# Patient Record
Sex: FEMALE | Race: WHITE | NOT HISPANIC OR LATINO | Employment: FULL TIME | URBAN - NONMETROPOLITAN AREA
[De-identification: names, ages, dates, MRNs, and addresses within clinical notes are randomized per-mention and may not be internally consistent; named-entity substitution may affect disease eponyms.]

---

## 2017-07-14 ENCOUNTER — OFFICE VISIT - GICH (OUTPATIENT)
Dept: FAMILY MEDICINE | Facility: OTHER | Age: 22
End: 2017-07-14

## 2017-07-14 ENCOUNTER — HISTORY (OUTPATIENT)
Dept: FAMILY MEDICINE | Facility: OTHER | Age: 22
End: 2017-07-14

## 2017-07-14 DIAGNOSIS — N91.2 AMENORRHEA: ICD-10-CM

## 2017-07-14 DIAGNOSIS — Z33.1 PREGNANT STATE, INCIDENTAL: ICD-10-CM

## 2017-07-14 LAB — HCG UR QL: POSITIVE

## 2017-11-25 ENCOUNTER — HISTORY (OUTPATIENT)
Dept: EMERGENCY MEDICINE | Facility: OTHER | Age: 22
End: 2017-11-25

## 2017-12-16 ENCOUNTER — COMMUNICATION - GICH (OUTPATIENT)
Dept: INTERNAL MEDICINE | Facility: OTHER | Age: 22
End: 2017-12-16

## 2017-12-16 DIAGNOSIS — M79.7 FIBROMYALGIA: ICD-10-CM

## 2017-12-16 DIAGNOSIS — M79.10 MYALGIA: ICD-10-CM

## 2017-12-18 ENCOUNTER — COMMUNICATION - GICH (OUTPATIENT)
Dept: INTERNAL MEDICINE | Facility: OTHER | Age: 22
End: 2017-12-18

## 2017-12-18 DIAGNOSIS — M79.10 MYALGIA: ICD-10-CM

## 2017-12-18 DIAGNOSIS — M79.7 FIBROMYALGIA: ICD-10-CM

## 2017-12-27 NOTE — PROGRESS NOTES
Patient Information     Patient Name MRN Jana Pittman [id number] Female 1995      Progress Notes by Kalie Banks NP at 2017 12:30 PM     Author:  Kalie Banks NP Service:  (none) Author Type:  PHYS- Nurse Practitioner     Filed:  2017  5:19 PM Encounter Date:  2017 Status:  Signed     :  Kalie Banks NP (PHYS- Nurse Practitioner)            HPI:    Jana Beyer is a 22 y.o. female who presents to clinic today for confirmation of pregnancy, had positive home pregnancy test. Also reports some nausea, no real vomiting over the last month. Patient was off birth control for 1 month in march and then went back to nuva ring. Patient has stopped nuva ring 3 weeks ago. Patient would like pregnancy verification. Is unsure if pregnancy started in march or with use of nuva ring recently.  Denies feeling any fetal movement yet but feels she has somewhat of a protruding lower abdomen and thinks possibly pregnancy started in March. Reports is a non drinker, smokes less than 1 ppd. Knows she needs to quit. Is eating and taking fluids well. Feels well, wants confirmation of pregnancy.       Past Medical History:     Diagnosis  Date     , ELECTIVE 2011     Alcohol intoxication (HC)  2009    Hospitalized w/acute alcohol intoxication      Alcohol intoxication (HC) 2016     Ectopic pregnancy, tubal 2012     I U D REMOVAL/CHECK 11/15/2011     Intentional overdose of drug in tablet form (HC) 2016     Polyhydramnios 2013     RECTAL BLEEDING 2011     Ruptured ectopic pregnancy 2012     SUBSTANCE ABUSE 2011    THC noted on tox screen 2016 and 2016      TOBACCO ABUSE 3/19/2011     Past Surgical History:      Procedure  Laterality Date     SALPINGO-OOPHORECTOMY  12    Partial Left ,ectopic. Laprascopic salpingectomy       Social History        Substance Use Topics          Smoking status:   Current Every Day  Smoker      Packs/day:  0.75      Years:  4.00      Types:  Cigarettes      Smokeless tobacco:   Never Used       Comment: patient smokes       Alcohol use   0.0 oz/week     0 Standard drinks or equivalent per week        Comment: rarely       Current Outpatient Prescriptions       Medication  Sig Dispense Refill     acetaminophen (TYLENOL) 325 mg tablet Take 650-975 mg by mouth every 4 hours if needed (Pain or Headache). Max acetaminophen dose: 4000mg in 24 hrs.       mupirocin 2% topical (BACTROBAN OINTMENT) ointment APPLY TOPICALLY BID  0     No current facility-administered medications for this visit.      Medications have been reviewed by me and are current to the best of my knowledge and ability.    No Known Allergies    ROS:  Refer to HPI, otherwise negative.    EXAM:  General appearance: well appearing young lady, in no acute distress  Head: normocephalic, atraumatic  Eyes: conjunctivae normal  Orophayrnx: moist mucous membranes,  Neck: supple without adenopathy  Respiratory: clear to auscultation bilaterally  Cardiac: RRR with no murmurs  Abdomen: soft, nontender,   : Suprapubic area with firm non tender protuberance.   Dermatological: no rashes or lesions  Psychological: normal affect, alert and pleasant  Results for orders placed or performed in visit on 17      Urine pregnancy test (HCG), qualitative      Result  Value Ref Range    PREGNANCY,URINE           Positive (Positive) Negative         ASSESSMENT/PLAN:    ICD-10-CM    1. Amenorrhea N91.2 Urine pregnancy test (HCG), qualitative      Urine pregnancy test (HCG), qualitative   2. Pregnancy, unspecified gestational age Z33.1 Prenatal Multivit-Ca-Min-Fe-FA (PRENATAL VITAMIN) tab tablet       Plan: Positive for pregnancy Para 1,  2 In further discussion pt states wants clarification of EDC.   Discussed quantitative serum HCG, prefers to have this done upon return for establishing prenatal care.   Discussed need for positive proactive  prenatal care starting within a week and need to establish w provider and define EDC.  Pt agrees to follow up in the next week.   Reviewed self care measures, no drinking, no smoking or other drug including non prescribed mediations.   Positive nutrition needs. Rx for prenatal vitamins provided.   See patient instructions. Pt in agreements with plan.  Discussed expected course, Signs and symptoms to return to PCP.   No further questions.       Patient Instructions   Your pregnancy test was positive, so you need to set up an appointment with your primary care provider in the next week.     No drinking of any alcohol, no smoking or taking of any other drugs.     I did order your prenatal vitamins which you should start taking today.            Index Tajik Related topics   Prenatal Care   ________________________________________________________________________  KEY POINTS    Prenatal care is the care you receive while you are pregnant. Prenatal care improves your chances for a healthy pregnancy and healthy baby.    At each visit, your healthcare provider will check to make sure that you and the baby are healthy. Besides having a checkup, you may have blood tests, ultrasound scans, or other tests.    Keep all appointments with your healthcare provider. Use these visits to discuss your pregnancy concerns or problems.  ________________________________________________________________________  What is prenatal care?  Prenatal care is the care you receive while you are pregnant. It includes care given by your healthcare provider, support from your family, and giving yourself the care you need. Prenatal care improves your chances for a healthy pregnancy and healthy baby.  When should I see my healthcare provider?  If you are not yet pregnant but plan to get pregnant in the next few months, see your healthcare provider. Your provider may do some tests and talk about things you can do to have a healthy pregnancy and healthy  baby.  You should schedule your first prenatal visit with your healthcare provider as soon as you think you are pregnant. Depending on your health and medical history, your provider will probably schedule visits at least once a month for the first 6 months. During the 7th and 8th months you will see your provider every 2 weeks. During the last month you will probably see your provider once a week until you deliver your baby. If your pregnancy is high risk, your provider will probably want to see you more often or may refer you to a specialist. Pregnancy is considered high risk if you are over the age of 35, or have diabetes or other health problems.  What will happen at the first prenatal visit?  At your first visit, your healthcare provider will ask about your personal medical history. He or she will also ask about the baby s father and your family health history. This information can help give your provider an idea of any problems you may have during your pregnancy. You will have a physical exam, including checks of your height, weight, and blood pressure, and a pelvic exam. You will have a Pap test, urine tests, blood tests, and cultures of the cervix and vagina to check for infection. Your provider will make sure that your shots are up to date.  Your healthcare provider will calculate your due date and the age of your baby. If your periods were regular before you got pregnant, and you are sure of the day when your last period started, your due date will be estimated to be 40 weeks from that day.  Your healthcare provider will talk to you about how to stay healthy during your pregnancy.  What will happen at other prenatal visits?  At each visit your healthcare provider will check to make sure that you and the baby are healthy. Regular visits can help you and your provider prevent possible problems. They can also help your provider find and treat any problems early. In addition to meeting your medical needs, your  provider will advise you about caring for yourself. You will talk about how to have a healthy diet and get plenty of exercise and rest. Your provider can also help you deal with the emotional changes that can happen during pregnancy.  Your healthcare provider will discuss how you are feeling, ask if you have any problems, and answer your questions.  During each prenatal visit your healthcare provider will:    Weigh you    Take your blood pressure    Check your urine for sugar, protein, or bacteria    Check your face, hands, ankles, and feet for swelling    Listen to the baby's heartbeat, starting at about 12 weeks    Measure the size of your uterus to check the baby s growth  At different times during the pregnancy, other exams and tests may be done. Some are routine and others are done only when a problem is suspected or you have a risk factor for a problem. Examples of other tests you might have are:    Tests to check for genetic problems and some birth defects    Ultrasound scans to check the baby's growth, development, and health and to look at your uterus, the bag of fluid that surrounds the baby (amniotic sac), and the placenta (the tissue that carries oxygen and food from your blood to the baby s blood)    Blood tests to check for diabetes    Electronic monitoring to check the health of the baby  You may also be given shots to protect you against some common infections.  How can I take care of myself during my pregnancy?  Here are some things you can do to take good care of yourself during your pregnancy and prepare for the birth of your child:    Keep all appointments with your healthcare provider. Use these visits to discuss your pregnancy concerns or problems. Write down questions before each visit so that you won t forget about things you want to talk about.    Eat healthy meals that include whole grains, fresh fruits and vegetables, and calcium-rich foods, such as milk, cheese, and yogurt. Choose foods  low in saturated fat. Ask your provider if there are foods you should not eat or if you should limit how much you eat.    Drink plenty of water each day.    Take vitamins, other supplements, and medicines as recommended by your healthcare provider. Talk to your provider before you take any medicine, including nonprescription and herbal medicines. Some medicines are not safe during pregnancy.    Unless your healthcare provider tells you not to, try to be physically active for at least 30 minutes a day, most days of the week. You might find it easier to exercise 10 minutes at a time, 3 times a day. You may want to take a prenatal exercise class.    Do not smoke, drink alcohol, or take illegal drugs.    Avoid hot tubs or saunas.    If you have cats in your home, do not empty the cat litter while you are pregnant. It may contain a parasite that causes an infection called toxoplasmosis, which can cause birth defects. Also, use gloves when you work in garden areas used by cats.    Stay away from toxic chemicals like insecticides, solvents (such as some  or paint thinners), lead, and mercury. Check labels on household products. Most dangerous products have pregnancy warnings on their labels. Ask your healthcare provider about products if you are unsure.    Relax by taking breaks from work or chores.    Help reduce stress by sharing your feelings with others.    Report any violence or other types of abuse in your home.    Learn more about pregnancy, labor, and delivery. Read books, watch videos, go to a childbirth class, and talk with experienced moms.    Plan for the lifestyle changes a new baby will bring. Prepare for possible changes in your budget, work situation, daily schedule, and relationships with family and friends.    Talk to your healthcare provider about the pros and cons of breast-feeding.  Before and during your pregnancy, try to do everything you can to keep yourself and your baby healthy during your  pregnancy.  Developed by [employer].  Adult Advisor 2016.3 published by [employer].  Last modified: 2016-02-17  Last reviewed: 2016-02-10  This content is reviewed periodically and is subject to change as new health information becomes available. The information is intended to inform and educate and is not a replacement for medical evaluation, advice, diagnosis or treatment by a healthcare professional.  References   Adult Advisor 2016.3 Index    Copyright   2016 [employer], a division of [employer] All rights reserved.

## 2017-12-29 NOTE — PATIENT INSTRUCTIONS
Patient Information     Patient Name MRN Jana Pittman [id number] Female 1995      Patient Instructions by Kalie Banks NP at 2017  1:30 PM     Author:  Kalie Banks NP  Service:  (none) Author Type:  PHYS- Nurse Practitioner     Filed:  2017  1:30 PM  Encounter Date:  2017 Status:  Addendum     :  Kalie Banks NP (PHYS- Nurse Practitioner)        Related Notes: Original Note by Kalie Banks NP (PHYS- Nurse Practitioner) filed at 2017  1:30 PM            Your pregnancy test was positive, so you need to set up an appointment with your primary care provider in the next week.     No drinking of any alcohol, no smoking or taking of any other drugs.     I did order your prenatal vitamins which you should start taking today.            Index Danish Related topics   Prenatal Care   ________________________________________________________________________  KEY POINTS    Prenatal care is the care you receive while you are pregnant. Prenatal care improves your chances for a healthy pregnancy and healthy baby.    At each visit, your healthcare provider will check to make sure that you and the baby are healthy. Besides having a checkup, you may have blood tests, ultrasound scans, or other tests.    Keep all appointments with your healthcare provider. Use these visits to discuss your pregnancy concerns or problems.  ________________________________________________________________________  What is prenatal care?  Prenatal care is the care you receive while you are pregnant. It includes care given by your healthcare provider, support from your family, and giving yourself the care you need. Prenatal care improves your chances for a healthy pregnancy and healthy baby.  When should I see my healthcare provider?  If you are not yet pregnant but plan to get pregnant in the next few months, see your healthcare provider. Your provider may do some tests and talk about  things you can do to have a healthy pregnancy and healthy baby.  You should schedule your first prenatal visit with your healthcare provider as soon as you think you are pregnant. Depending on your health and medical history, your provider will probably schedule visits at least once a month for the first 6 months. During the 7th and 8th months you will see your provider every 2 weeks. During the last month you will probably see your provider once a week until you deliver your baby. If your pregnancy is high risk, your provider will probably want to see you more often or may refer you to a specialist. Pregnancy is considered high risk if you are over the age of 35, or have diabetes or other health problems.  What will happen at the first prenatal visit?  At your first visit, your healthcare provider will ask about your personal medical history. He or she will also ask about the baby s father and your family health history. This information can help give your provider an idea of any problems you may have during your pregnancy. You will have a physical exam, including checks of your height, weight, and blood pressure, and a pelvic exam. You will have a Pap test, urine tests, blood tests, and cultures of the cervix and vagina to check for infection. Your provider will make sure that your shots are up to date.  Your healthcare provider will calculate your due date and the age of your baby. If your periods were regular before you got pregnant, and you are sure of the day when your last period started, your due date will be estimated to be 40 weeks from that day.  Your healthcare provider will talk to you about how to stay healthy during your pregnancy.  What will happen at other prenatal visits?  At each visit your healthcare provider will check to make sure that you and the baby are healthy. Regular visits can help you and your provider prevent possible problems. They can also help your provider find and treat any  problems early. In addition to meeting your medical needs, your provider will advise you about caring for yourself. You will talk about how to have a healthy diet and get plenty of exercise and rest. Your provider can also help you deal with the emotional changes that can happen during pregnancy.  Your healthcare provider will discuss how you are feeling, ask if you have any problems, and answer your questions.  During each prenatal visit your healthcare provider will:    Weigh you    Take your blood pressure    Check your urine for sugar, protein, or bacteria    Check your face, hands, ankles, and feet for swelling    Listen to the baby's heartbeat, starting at about 12 weeks    Measure the size of your uterus to check the baby s growth  At different times during the pregnancy, other exams and tests may be done. Some are routine and others are done only when a problem is suspected or you have a risk factor for a problem. Examples of other tests you might have are:    Tests to check for genetic problems and some birth defects    Ultrasound scans to check the baby's growth, development, and health and to look at your uterus, the bag of fluid that surrounds the baby (amniotic sac), and the placenta (the tissue that carries oxygen and food from your blood to the baby s blood)    Blood tests to check for diabetes    Electronic monitoring to check the health of the baby  You may also be given shots to protect you against some common infections.  How can I take care of myself during my pregnancy?  Here are some things you can do to take good care of yourself during your pregnancy and prepare for the birth of your child:    Keep all appointments with your healthcare provider. Use these visits to discuss your pregnancy concerns or problems. Write down questions before each visit so that you won t forget about things you want to talk about.    Eat healthy meals that include whole grains, fresh fruits and vegetables, and  calcium-rich foods, such as milk, cheese, and yogurt. Choose foods low in saturated fat. Ask your provider if there are foods you should not eat or if you should limit how much you eat.    Drink plenty of water each day.    Take vitamins, other supplements, and medicines as recommended by your healthcare provider. Talk to your provider before you take any medicine, including nonprescription and herbal medicines. Some medicines are not safe during pregnancy.    Unless your healthcare provider tells you not to, try to be physically active for at least 30 minutes a day, most days of the week. You might find it easier to exercise 10 minutes at a time, 3 times a day. You may want to take a prenatal exercise class.    Do not smoke, drink alcohol, or take illegal drugs.    Avoid hot tubs or saunas.    If you have cats in your home, do not empty the cat litter while you are pregnant. It may contain a parasite that causes an infection called toxoplasmosis, which can cause birth defects. Also, use gloves when you work in garden areas used by cats.    Stay away from toxic chemicals like insecticides, solvents (such as some  or paint thinners), lead, and mercury. Check labels on household products. Most dangerous products have pregnancy warnings on their labels. Ask your healthcare provider about products if you are unsure.    Relax by taking breaks from work or chores.    Help reduce stress by sharing your feelings with others.    Report any violence or other types of abuse in your home.    Learn more about pregnancy, labor, and delivery. Read books, watch videos, go to a childbirth class, and talk with experienced moms.    Plan for the lifestyle changes a new baby will bring. Prepare for possible changes in your budget, work situation, daily schedule, and relationships with family and friends.    Talk to your healthcare provider about the pros and cons of breast-feeding.  Before and during your pregnancy, try to do  everything you can to keep yourself and your baby healthy during your pregnancy.  Developed by [employer].  Adult Advisor 2016.3 published by [employer].  Last modified: 2016-02-17  Last reviewed: 2016-02-10  This content is reviewed periodically and is subject to change as new health information becomes available. The information is intended to inform and educate and is not a replacement for medical evaluation, advice, diagnosis or treatment by a healthcare professional.  References   Adult Advisor 2016.3 Index    Copyright   2016 [employer], a division of [employer] All rights reserved.

## 2017-12-30 NOTE — NURSING NOTE
Patient Information     Patient Name MRN Jana Pittman [id number] Female 1995      Nursing Note by Arianna Lazar at 2017 12:30 PM     Author:  Arianna Lazar Service:  (none) Author Type:  NURS- Student Practical Nurse     Filed:  2017  1:16 PM Encounter Date:  2017 Status:  Signed     :  Arianna Lazar (NURS- Student Practical Nurse)            Patient presents with positive home pregnancy, nausea. Patient was off birth control for 1 month in march and then went back to nuva ring. Patient has stopped nuva ring 3 weeks ago. Patient would like pregnancy verification.

## 2018-01-09 ENCOUNTER — COMMUNICATION - GICH (OUTPATIENT)
Dept: INTERNAL MEDICINE | Facility: OTHER | Age: 23
End: 2018-01-09

## 2018-01-09 DIAGNOSIS — M79.10 MYALGIA: ICD-10-CM

## 2018-01-09 DIAGNOSIS — M79.7 FIBROMYALGIA: ICD-10-CM

## 2018-01-26 VITALS
TEMPERATURE: 98.7 F | SYSTOLIC BLOOD PRESSURE: 120 MMHG | HEART RATE: 80 BPM | WEIGHT: 127.4 LBS | BODY MASS INDEX: 21.75 KG/M2 | HEIGHT: 64 IN | DIASTOLIC BLOOD PRESSURE: 60 MMHG

## 2018-02-01 ENCOUNTER — DOCUMENTATION ONLY (OUTPATIENT)
Dept: FAMILY MEDICINE | Facility: OTHER | Age: 23
End: 2018-02-01

## 2018-02-01 PROBLEM — M79.7 FIBROMYALGIA: Status: ACTIVE | Noted: 2017-11-25

## 2018-02-01 PROBLEM — M79.18 MYOFASCIAL MUSCLE PAIN: Status: ACTIVE | Noted: 2017-11-25

## 2018-02-01 RX ORDER — ACETAMINOPHEN 325 MG/1
650-975 TABLET ORAL EVERY 4 HOURS PRN
COMMUNITY
End: 2019-01-17

## 2018-02-01 RX ORDER — GABAPENTIN 300 MG/1
300 CAPSULE ORAL 3 TIMES DAILY PRN
COMMUNITY
Start: 2018-01-09 | End: 2018-05-29

## 2018-02-12 NOTE — TELEPHONE ENCOUNTER
Patient Information     Patient Name MRN Jana Pittman [id number] Female 1995      Telephone Encounter by Maura Snow RN at 2017 11:55 AM     Author:  Maura Snow RN Service:  (none) Author Type:  NURS- Registered Nurse     Filed:  2017 12:00 PM Encounter Date:  2017 Status:  Signed     :  Maura Snow RN (NURS- Registered Nurse)            gabapentin (NEURONTIN) 300 mg capsule  TAKE 1 CAPSULE BY MOUTH THREE TIMES DAILY AS NEEDED FOR FIBROMYALGIA PAIN/SLEEP  Disp: 21 capsule Refills: 0    Class: eRx Start: 2017    For: Myofascial muscle pain, Fibromyalgia  Originally ordered: 3 weeks ago by Nakul Haji MD  Last refill:2017  To be filled at: [facility] [address] [place] [address] AT [address] - [phone]: [phone]    Last visit with NAKUL HAJI was on: No past appointments listed with this provider  PCP:  No Pcp  Controlled Substance Agreement:  None noted     Patient has no  PCP noted Gabapentin noted as prescribed at ED visit  On 17 by Dr. Haji.  Will route to Dr. Haji for consideration of refill      Unable to complete prescription refill per RN Medication Refill Policy.................... MAURA SNOW RN ....................  2017   11:58 AM

## 2018-02-12 NOTE — TELEPHONE ENCOUNTER
Patient Information     Patient Name MRN Jana Pittman [id number] Female 1995      Telephone Encounter by Kimo Haji MD at 2017  7:42 PM     Author:  Kimo Haji MD Service:  (none) Author Type:  Physician     Filed:  2017  7:43 PM Encounter Date:  2017 Status:  Signed     :  Kimo Haji MD (Physician)            Noted. rx sent to pharmacy.   Advise to follow-up with me, prior to running out of gabapentin.     Kimo Haji MD

## 2018-02-12 NOTE — TELEPHONE ENCOUNTER
Patient Information     Patient Name MRN Jana Pittman [id number] Female 1995      Telephone Encounter by Katie Levin at 2017  3:47 PM     Author:  Katie Levin Service:  (none) Author Type:  (none)     Filed:  2017  3:54 PM Encounter Date:  2017 Status:  Signed     :  Katie Levin            Talked with Jana and she states that she requested a refill on the gabapentin. After reviewing chart, this was denied by Dr. Haji stating that she needs to get it form her psychiatrist. Patient states that she does not see anyone. Her PCP used to be Dr. Murray. She is wondering if she could get 1 weeks worth until she can get in? She states that her car is broken down right now and she is not able to get a ride into town for an appointment.  She does have someone that could  her Rx for her.  Katie Levin LPN  2017  3:52 PM

## 2018-02-12 NOTE — TELEPHONE ENCOUNTER
Patient Information     Patient Name MRN Jana Pittman [id number] Female 1995      Telephone Encounter by Maura Murry RN at 2017  3:42 PM     Author:  Maura Murry RN Service:  (none) Author Type:  NURS- Registered Nurse     Filed:  2017  3:44 PM Encounter Date:  2017 Status:  Signed     :  Maura Murry RN (NURS- Registered Nurse)            [facility] notified.  MAURA MURRY RN ....................  2017   3:42 PM
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
## 2018-02-12 NOTE — TELEPHONE ENCOUNTER
Patient Information     Patient Name MRN Jana Pittman [id number] Female 1995      Telephone Encounter by Mireille Aranda at 2017  8:55 AM     Author:  Mireille Aranda Service:  (none) Author Type:  (none)     Filed:  2017  8:58 AM Encounter Date:  2017 Status:  Signed     :  Mireille Aranda            Spoke with patient and notified per Kimo Haji MD.Mireille Aranda LPN......................2017 8:58 AM

## 2018-05-29 DIAGNOSIS — M79.7 FIBROMYALGIA: Primary | ICD-10-CM

## 2018-05-29 RX ORDER — GABAPENTIN 300 MG/1
300 CAPSULE ORAL 3 TIMES DAILY PRN
Qty: 15 CAPSULE | Refills: 0 | Status: SHIPPED | OUTPATIENT
Start: 2018-05-29 | End: 2019-01-17

## 2018-05-29 NOTE — TELEPHONE ENCOUNTER
Pt in [facility] for , wants to know if can get temporary supply of gabapentin, has not slept in 5 days.  Please send to [facility] in [facility], ph# [phone]. Please call.

## 2018-05-29 NOTE — TELEPHONE ENCOUNTER
Writer contacted patient. Alerted her that Rx as requested was filled as noted below:    Medication Detail      Disp Refills Start End      gabapentin (NEURONTIN) 300 MG capsule 15 capsule 0 5/29/2018     Sig - Route: Take 1 capsule (300 mg) by mouth 3 times daily as needed - Oral    Class: E-Prescribe    Order: [id number]    E-Prescribing Status: Receipt confirmed by pharmacy (5/29/2018 10:12 AM CDT)    Printout Tracking   External Result Report   Pharmacy   [facility] [address] [place] [address] [facility] AT [address] [place] [address]     Patient happy with plan of care. Will call back with any further questions or concerns.    Stanford Santos RN on 5/29/2018 at 10:18 AM

## 2018-06-01 DIAGNOSIS — M79.7 FIBROMYALGIA: ICD-10-CM

## 2018-06-01 RX ORDER — GABAPENTIN 300 MG/1
300 CAPSULE ORAL 3 TIMES DAILY PRN
Qty: 15 CAPSULE | Refills: 0 | OUTPATIENT
Start: 2018-06-01

## 2018-06-01 NOTE — TELEPHONE ENCOUNTER
At this time I would recommend that she decrease her dose of Neurontin gradually through the weekend and due to recurrent refills of a controlled substance with out a visit I would defer to her primary care provider.

## 2018-06-01 NOTE — TELEPHONE ENCOUNTER
Writer received soft transfer with patient on the line. Per scheduling staff, patient is looking for a refill of her gabapentin.    Writer accepted soft transfer. Patient reports:    She is in [place] at a , received a 5 day supply of gabapentin from Dr. Haji on 18, but has now decided to stay a little longer. Is looking for additional supply of gabapentin if possible.    Writer notes that PCP is out of the office this afternoon, as well as that patient is overdue for follow up with PCP, and has been given several reminders in the past with previous refill requests. See 18 refill encounter, 17 refill encounter, as well as note that Rx was prescribed by Dr. Haji at an ED visit on 17.    Patient is aware of need to follow up with Dr. Haji, was given an additional reminder at this time.    Writer will jaz up and route limited refill request to PCP's teamlet in PCP's absence for her consideration/approval at this time. Patient is aware that Rx may not be filled.    Pharmacy in [place] has been inputted into this encounter.    Unable to complete prescription refill per RN Medication Refill Policy. Stanford Santos 2018 1:50 PM

## 2018-06-04 NOTE — TELEPHONE ENCOUNTER
Writer contacted patient. Was unable to speak to patient at this time, but was able to leave message on patient's voicemail. Message left advising patient of Dr. Worley's response as noted. Advised patient to call back with any additional questions or concerns. Rx request has already been refused to pharmacy. Writer will close this encounter at this time.    Stanford Santos RN on 6/4/2018 at 8:12 AM

## 2018-07-15 ENCOUNTER — OFFICE VISIT (OUTPATIENT)
Dept: FAMILY MEDICINE | Facility: OTHER | Age: 23
End: 2018-07-15
Attending: NURSE PRACTITIONER
Payer: COMMERCIAL

## 2018-07-15 ENCOUNTER — HOSPITAL ENCOUNTER (OUTPATIENT)
Dept: GENERAL RADIOLOGY | Facility: OTHER | Age: 23
Discharge: HOME | End: 2018-07-15
Attending: NURSE PRACTITIONER | Admitting: NURSE PRACTITIONER
Payer: COMMERCIAL

## 2018-07-15 VITALS
DIASTOLIC BLOOD PRESSURE: 68 MMHG | SYSTOLIC BLOOD PRESSURE: 138 MMHG | RESPIRATION RATE: 18 BRPM | BODY MASS INDEX: 21.1 KG/M2 | HEART RATE: 104 BPM | WEIGHT: 122.1 LBS | TEMPERATURE: 100 F

## 2018-07-15 DIAGNOSIS — J20.9 ACUTE BRONCHITIS, UNSPECIFIED ORGANISM: ICD-10-CM

## 2018-07-15 DIAGNOSIS — R05.8 PRODUCTIVE COUGH: Primary | ICD-10-CM

## 2018-07-15 DIAGNOSIS — H65.93 OME (OTITIS MEDIA WITH EFFUSION), BILATERAL: ICD-10-CM

## 2018-07-15 DIAGNOSIS — R05.8 PRODUCTIVE COUGH: ICD-10-CM

## 2018-07-15 DIAGNOSIS — R09.81 NASAL SINUS CONGESTION: ICD-10-CM

## 2018-07-15 DIAGNOSIS — R50.9 FEVER AND CHILLS: ICD-10-CM

## 2018-07-15 DIAGNOSIS — R07.0 THROAT PAIN: ICD-10-CM

## 2018-07-15 PROCEDURE — 25000132 ZZH RX MED GY IP 250 OP 250 PS 637: Performed by: NURSE PRACTITIONER

## 2018-07-15 PROCEDURE — G0463 HOSPITAL OUTPT CLINIC VISIT: HCPCS | Mod: 25

## 2018-07-15 PROCEDURE — G0463 HOSPITAL OUTPT CLINIC VISIT: HCPCS

## 2018-07-15 PROCEDURE — 71046 X-RAY EXAM CHEST 2 VIEWS: CPT

## 2018-07-15 PROCEDURE — 99214 OFFICE O/P EST MOD 30 MIN: CPT | Performed by: NURSE PRACTITIONER

## 2018-07-15 RX ORDER — ACETAMINOPHEN 500 MG
1000 TABLET ORAL ONCE
Status: COMPLETED | OUTPATIENT
Start: 2018-07-15 | End: 2018-07-15

## 2018-07-15 RX ORDER — BENZONATATE 100 MG/1
100 CAPSULE ORAL 3 TIMES DAILY PRN
Qty: 42 CAPSULE | Refills: 0 | Status: SHIPPED | OUTPATIENT
Start: 2018-07-15 | End: 2019-01-17

## 2018-07-15 RX ADMIN — ACETAMINOPHEN 1000 MG: 500 TABLET, FILM COATED ORAL at 17:08

## 2018-07-15 ASSESSMENT — PAIN SCALES - GENERAL: PAINLEVEL: SEVERE PAIN (6)

## 2018-07-15 NOTE — PATIENT INSTRUCTIONS
Augmentin twice daily x 10 days     Tessalon as needed for cough - one or two during if needed and one or two at bedtime as needed    Mucinex as needed    Tylenol or ibuprofen     Push fluids    Follow up if worsening or concerns

## 2018-07-15 NOTE — MR AVS SNAPSHOT
"              After Visit Summary   7/15/2018    Jana Beyer    MRN: [id number]           Patient Information     Date Of Birth          1995        Visit Information        Provider Department      7/15/2018 4:15 PM Lisbet Pollack NP [facility]        Today's Diagnoses     Productive cough    -  1    Fever and chills        Nasal sinus congestion        Ear pain, bilateral        Acute bronchitis, unspecified organism        Throat pain          Care Instructions    Augmentin twice daily x 10 days     Tessalon as needed for cough - one or two during if needed and one or two at bedtime as needed    Mucinex as needed    Tylenol or ibuprofen     Push fluids    Follow up if worsening or concerns              Follow-ups after your visit        Future tests that were ordered for you today     Open Future Orders        Priority Expected Expires Ordered    XR Chest 2 Views Routine 7/15/2018 7/15/2019 7/15/2018            Who to contact     If you have questions or need follow up information about today's clinic visit or your schedule please contact [facility] directly at [phone].  Normal or non-critical lab and imaging results will be communicated to you by [employer], letter or phone within 4 business days after the clinic has received the results. If you do not hear from us within 7 days, please contact the clinic through [employer] or phone. If you have a critical or abnormal lab result, we will notify you by phone as soon as possible.  Submit refill requests through [employer] or call your pharmacy and they will forward the refill request to us. Please allow 3 business days for your refill to be completed.          Additional Information About Your Visit        [employer] Hawaiihar[employer]rox Information     [employer] lets you send messages to your doctor, view your test results, renew your prescriptions, schedule appointments and more. To sign up, go to [email or web address] . Click on \"Log " "in\" on the left side of the screen, which will take you to the Welcome page. Then click on \"Sign up Now\" on the right side of the page.     You will be asked to enter the access code listed below, as well as some personal information. Please follow the directions to create your username and password.     Your access code is: [email or web address]  Expires: 10/13/2018  5:07 PM     Your access code will  in 90 days. If you need help or a new code, please call your [facility] clinic or [phone].        Care EveryWhere ID     This is your Care EveryWhere ID. This could be used by other organizations to access your [facility] medical records  [id number]        Your Vitals Were     Pulse Temperature Respirations Last Period Breastfeeding? BMI (Body Mass Index)    104 100  F (37.8  C) (Tympanic) 18 2018 (Approximate) No 21.1 kg/m2       Blood Pressure from Last 3 Encounters:   07/15/18 138/68   17 120/60   16 118/60    Weight from Last 3 Encounters:   07/15/18 122 lb 1.6 oz (55.4 kg)   17 127 lb 6.4 oz (57.8 kg)   16 124 lb (56.2 kg)                 Today's Medication Changes          These changes are accurate as of 7/15/18  5:07 PM.  If you have any questions, ask your nurse or doctor.               Start taking these medicines.        Dose/Directions    amoxicillin-clavulanate 875-125 MG per tablet   Commonly known as:  AUGMENTIN   Used for:  Acute bronchitis, unspecified organism, Productive cough, Fever and chills, Nasal sinus congestion, Ear pain, bilateral, Throat pain   Started by:  Lisbet Pollack NP        Dose:  1 tablet   Take 1 tablet by mouth 2 times daily   Quantity:  20 tablet   Refills:  0       benzonatate 100 MG capsule   Commonly known as:  TESSALON   Used for:  Productive cough   Started by:  Lisbet Pollack NP        Dose:  100 mg   Take 1 capsule (100 mg) by mouth 3 times daily as needed   Quantity:  42 capsule   Refills:  0            Where to get your medicines    "   These medications were sent to [id number] - [place], [place] - [address] AT [address]  [address], [place] [address]     Phone:  [phone]     amoxicillin-clavulanate 875-125 MG per tablet    benzonatate 100 MG capsule                Primary Care Provider Office Phone # Fax #    Kimo Haji [phone]       [address]  [facility] [address]        Equal Access to Services     [facility] Hospital[facility]ÉCTOR : Hadii aad ku hadasho Soomaali, waaxda luqadaha, qaybta kaalmada adeegyada, waxay meredithin hayvalentinon abelardo betancourt . So [facility] [phone].    ATENCIÓN: Si habla español, tiene a doshi disposición servicios gratuitos de asistencia lingüística. [facility] [phone].    We comply with applicable federal civil rights laws and [place] laws. We do not discriminate on the basis of race, color, national origin, age, disability, sex, sexual orientation, or gender identity.            Thank you!     Thank you for choosing [facility]  for your care. Our goal is always to provide you with excellent care. Hearing back from our patients is one way we can continue to improve our services. Please take a few minutes to complete the written survey that you may receive in the mail after your visit with us. Thank you!             Your Updated Medication List - Protect others around you: Learn how to safely use, store and throw away your medicines at www.disposemymeds.org.          This list is accurate as of 7/15/18  5:07 PM.  Always use your most recent med list.                   Brand Name Dispense Instructions for use Diagnosis    acetaminophen 325 MG tablet    TYLENOL     Take 650-975 mg by mouth every 4 hours as needed for pain or headaches . Max acetaminophen dose: 4000mg in 24 hrs.        amoxicillin-clavulanate 875-125 MG per tablet    AUGMENTIN    20 tablet    Take 1 tablet by mouth 2 times daily    Acute bronchitis, unspecified  organism, Productive cough, Fever and chills, Nasal sinus congestion, Ear pain, bilateral, Throat pain       benzonatate 100 MG capsule    TESSALON    42 capsule    Take 1 capsule (100 mg) by mouth 3 times daily as needed    Productive cough       gabapentin 300 MG capsule    NEURONTIN    15 capsule    Take 1 capsule (300 mg) by mouth 3 times daily as needed    Fibromyalgia
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
## 2018-07-15 NOTE — NURSING NOTE
Jana presents to the clinic today for concerns of a cough and bilateral ear pain. Patient also complains that she has been hard of hearing. States that this has been going on since July 4th.        Philip Tracy LPN 07/15/18 4:25 PM

## 2018-07-18 ENCOUNTER — TELEPHONE (OUTPATIENT)
Dept: FAMILY MEDICINE | Facility: OTHER | Age: 23
End: 2018-07-18

## 2018-07-18 DIAGNOSIS — R05.8 PRODUCTIVE COUGH: ICD-10-CM

## 2018-07-18 DIAGNOSIS — R07.0 THROAT PAIN: ICD-10-CM

## 2018-07-18 DIAGNOSIS — R50.9 FEVER AND CHILLS: ICD-10-CM

## 2018-07-18 DIAGNOSIS — J20.9 ACUTE BRONCHITIS, UNSPECIFIED ORGANISM: ICD-10-CM

## 2018-07-18 DIAGNOSIS — R09.81 NASAL SINUS CONGESTION: ICD-10-CM
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
## 2018-07-21 ENCOUNTER — HOSPITAL ENCOUNTER (EMERGENCY)
Facility: OTHER | Age: 23
Discharge: HOME | End: 2018-07-21
Attending: EMERGENCY MEDICINE | Admitting: EMERGENCY MEDICINE
Payer: COMMERCIAL

## 2018-07-21 VITALS
BODY MASS INDEX: 20.91 KG/M2 | OXYGEN SATURATION: 95 % | HEART RATE: 108 BPM | DIASTOLIC BLOOD PRESSURE: 92 MMHG | TEMPERATURE: 99 F | WEIGHT: 121 LBS | RESPIRATION RATE: 14 BRPM | SYSTOLIC BLOOD PRESSURE: 140 MMHG

## 2018-07-21 DIAGNOSIS — F19.10 SUBSTANCE ABUSE (H): ICD-10-CM

## 2018-07-21 LAB
ALBUMIN UR-MCNC: NEGATIVE MG/DL
AMPHETAMINES UR QL SCN: NOT DETECTED
APPEARANCE UR: CLEAR
BARBITURATES UR QL: NOT DETECTED
BENZODIAZ UR QL: NOT DETECTED
BILIRUB UR QL STRIP: NEGATIVE
BUPRENORPHINE UR QL: NOT DETECTED NG/ML
CANNABINOIDS UR QL: ABNORMAL NG/ML
COCAINE UR QL: NOT DETECTED
COLOR UR AUTO: YELLOW
D-METHAMPHET UR QL: NOT DETECTED NG/ML
GLUCOSE UR STRIP-MCNC: NEGATIVE MG/DL
HCG UR QL: NEGATIVE
HGB UR QL STRIP: NEGATIVE
KETONES UR STRIP-MCNC: NEGATIVE MG/DL
LEUKOCYTE ESTERASE UR QL STRIP: NEGATIVE
METHADONE UR QL SCN: NOT DETECTED
NITRATE UR QL: NEGATIVE
OPIATES UR QL SCN: NOT DETECTED
OXYCODONE UR QL: ABNORMAL NG/ML
PCP UR QL SCN: NOT DETECTED
PH UR STRIP: 7 PH (ref 5–9)
PROPOXYPH UR QL: NOT DETECTED NG/ML
SOURCE: NORMAL
SP GR UR STRIP: <1.005 (ref 1–1.03)
TRICYCLICS UR QL SCN: NOT DETECTED NG/ML
UROBILINOGEN UR STRIP-ACNC: 0.2 EU/DL (ref 0.2–1)

## 2018-07-21 PROCEDURE — 81025 URINE PREGNANCY TEST: CPT | Performed by: EMERGENCY MEDICINE

## 2018-07-21 PROCEDURE — 80307 DRUG TEST PRSMV CHEM ANLYZR: CPT | Performed by: EMERGENCY MEDICINE

## 2018-07-21 PROCEDURE — 25000132 ZZH RX MED GY IP 250 OP 250 PS 637: Performed by: EMERGENCY MEDICINE

## 2018-07-21 PROCEDURE — 99283 EMERGENCY DEPT VISIT LOW MDM: CPT | Performed by: EMERGENCY MEDICINE

## 2018-07-21 PROCEDURE — 99283 EMERGENCY DEPT VISIT LOW MDM: CPT | Mod: Z6 | Performed by: EMERGENCY MEDICINE

## 2018-07-21 PROCEDURE — 81003 URINALYSIS AUTO W/O SCOPE: CPT | Performed by: EMERGENCY MEDICINE

## 2018-07-21 RX ORDER — LORAZEPAM 1 MG/1
1 TABLET ORAL ONCE
Status: COMPLETED | OUTPATIENT
Start: 2018-07-21 | End: 2018-07-21

## 2018-07-21 RX ADMIN — LORAZEPAM 1 MG: 1 TABLET ORAL at 17:24

## 2018-07-21 ASSESSMENT — ENCOUNTER SYMPTOMS
CHILLS: 0
VOMITING: 0
NAUSEA: 0
FEVER: 0
SHORTNESS OF BREATH: 0
CHEST TIGHTNESS: 0
ARTHRALGIAS: 0
LIGHT-HEADEDNESS: 0
DYSURIA: 0
AGITATION: 0

## 2018-07-21 NOTE — TELEPHONE ENCOUNTER
[facility] confirm that patient has not picked up antibiotics yet.    No answer at return call back number, voicemail has not been set up.  Unable to leave a message.      Deepali Glasgow LPN 7/21/2018 10:47 AM

## 2018-07-21 NOTE — ED AVS SNAPSHOT
[facility]    [address]    [place] [address]    Phone:  [phone]    Fax:  [phone]                                       Jana Beyer   MRN: [id number]    Department:  [facility]   Date of Visit:  7/21/2018           Patient Information     Date Of Birth          1995        Your diagnoses for this visit were:     Substance abuse        You were seen by Jasper Weber MD.        Discharge Instructions       Follow-up with crisis response team and with the services that you discussed with them. Return if worse.        24 Hour Appointment Hotline     To schedule an appointment at [facility], please call [phone]. If you don't have a family doctor or clinic, we will help you find one. [facility] clinics are conveniently located to serve the needs of you and your family.           Review of your medicines      Our records show that you are taking the medicines listed below. If these are incorrect, please call your family doctor or clinic.        Dose / Directions Last dose taken    acetaminophen 325 MG tablet   Commonly known as:  TYLENOL   Dose:  650-975 mg        Take 650-975 mg by mouth every 4 hours as needed for pain or headaches . Max acetaminophen dose: 4000mg in 24 hrs.   Refills:  0        amoxicillin-clavulanate 875-125 MG per tablet   Commonly known as:  AUGMENTIN   Dose:  1 tablet   Quantity:  14 tablet        Take 1 tablet by mouth 2 times daily for 7 days   Refills:  0        benzonatate 100 MG capsule   Commonly known as:  TESSALON   Dose:  100 mg   Quantity:  42 capsule        Take 1 capsule (100 mg) by mouth 3 times daily as needed   Refills:  0        gabapentin 300 MG capsule   Commonly known as:  NEURONTIN   Dose:  300 mg   Quantity:  15 capsule        Take 1 capsule (300 mg) by mouth 3 times daily as needed   Refills:  0                Procedures and tests performed during your visit     *UA reflex to Microscopic     "Drug of Abuse Screen Urine GH    HCG qualitative urine      Orders Needing Specimen Collection     None      Pending Results     No orders found from 2018 to 2018.            Pending Culture Results     No orders found from 2018 to 2018.            Pending Results Instructions     If you had any lab results that were not finalized at the time of your Discharge, you can call the ED Lab Result RN at [phone]. You will be contacted by this team for any positive Lab results or changes in treatment. The nurses are available 7 days a week from 10A to 6:30P.  You can leave a message 24 hours per day and they will return your call.        Thank you for choosing [facility]       Thank you for choosing [facility] for your care. Our goal is always to provide you with excellent care. Hearing back from our patients is one way we can continue to improve our services. Please take a few minutes to complete the written survey that you may receive in the mail after you visit with us. Thank you!        [employer] Information     [employer] lets you send messages to your doctor, view your test results, renew your prescriptions, schedule appointments and more. To sign up, go to [email or web address] . Click on \"Log in\" on the left side of the screen, which will take you to the Welcome page. Then click on \"Sign up Now\" on the right side of the page.     You will be asked to enter the access code listed below, as well as some personal information. Please follow the directions to create your username and password.     Your access code is: [email or web address]  Expires: 10/13/2018  5:07 PM     Your access code will  in 90 days. If you need help or a new code, please call your [facility] clinic or [phone].        Care EveryWhere ID     This is your Care EveryWhere ID. This could be used by other organizations to access your [facility] medical records  [id number]        Equal Access to Services     VALDEMAR SALAZAR AH: Virgen hancock " casi Salazar, enedina trujillo, joe avery, rojas snowden. So [facility] [phone].    ATENCIÓN: Si habla español, tiene a doshi disposición servicios gratuitos de asistencia lingüística. Llame al [phone].    We comply with applicable federal civil rights laws and [place] laws. We do not discriminate on the basis of race, color, national origin, age, disability, sex, sexual orientation, or gender identity.            After Visit Summary       This is your record. Keep this with you and show to your community pharmacist(s) and doctor(s) at your next visit.

## 2018-07-21 NOTE — ED PROVIDER NOTES
History     Chief Complaint   Patient presents with     Addiction Problem     The history is provided by the patient and a parent.     Jana Beyer is a 23 year old female who is brought in by her mother who states that the patient has been stealing narcotics and benzodiazepines. Apparently mom is helping take care of a hospice patient in their home who has oxycodone. Mom believes that the patient has been taking some of these. Also concerned about other substance use. The patient does have a history of this in the past and mom is worried that she is becoming addicted again. She wants help for her daughter. The daughter says that she has taken some benzodiazepines but initially denies oxycodone. She denies any thoughts of harming herself or anyone else. The mother did speak with law enforcement regarding this as well. The mother and the daughter seemed to be having a very hard time communicating and when they're both in the room together there is a lot of yelling and tears.    Problem List:    Patient Active Problem List    Diagnosis Date Noted     Fibromyalgia 11/25/2017     Priority: Medium     Myofascial muscle pain 11/25/2017     Priority: Medium     Acne vulgaris 12/06/2016     Priority: Medium     Episodic mood disorder (H) 12/06/2016     Priority: Medium     Engages in binge consumption of alcohol 11/27/2016     Priority: Medium     History of suicide attempt 11/27/2016     Priority: Medium     Tetrahydrocannabinol (THC) use disorder, mild, abuse 05/25/2016     Priority: Medium     Tanning jo use 05/13/2016     Priority: Medium     Chlamydial infection 05/12/2015     Priority: Medium     Overview:   Treated with azithromycin 1g x 1 dose on 5/12/15.       Contraceptive management 02/05/2014     Priority: Medium     Allergic rhinitis 07/25/2011     Priority: Medium     Narcotic abuse, episodic 07/21/2011     Priority: Medium     Overview:   THC noted on tox screen 5/2016 and 11/27/2016       Tobacco  abuse 03/19/2011     Priority: Medium        Past Medical History:    Past Medical History:   Diagnosis Date     Alcohol use with intoxication (H)      Alcohol use with intoxication (H)      Ectopic pregnancy without intrauterine pregnancy      Encounter for elective termination of pregnancy      Encounter for routine checking of intrauterine contraceptive device      Hemorrhage of anus and rectum      Nicotine dependence, uncomplicated      Other psychoactive substance abuse, uncomplicated      Poisoning by unspecified drugs, medicaments and biological substances, intentional self-harm, initial encounter (H)      Polyhydramnios      Tubal pregnancy without intrauterine pregnancy        Past Surgical History:    Past Surgical History:   Procedure Laterality Date     SALPINGO-OOPHORECTOMY BILATERAL      11/02/12,Partial Left ,ectopic. Laprascopic salpingectomy       Family History:    Family History   Problem Relation Age of Onset     Other - See Comments Brother      ADHD       Social History:  Marital Status:  Single [1]  Social History   Substance Use Topics     Smoking status: Current Every Day Smoker     Packs/day: 0.75     Years: 4.00     Types: Cigarettes     Smokeless tobacco: Never Used      Comment: Quit smoking: patient smokes     Alcohol use No      Comment: Alcoholic Drinks/day: rarely        Medications:      acetaminophen (TYLENOL) 325 MG tablet   amoxicillin-clavulanate (AUGMENTIN) 875-125 MG per tablet   benzonatate (TESSALON) 100 MG capsule   gabapentin (NEURONTIN) 300 MG capsule         Review of Systems   Constitutional: Negative for chills and fever.   HENT: Negative for congestion.    Eyes: Negative for visual disturbance.   Respiratory: Negative for chest tightness and shortness of breath.    Cardiovascular: Negative for chest pain.   Gastrointestinal: Negative for nausea and vomiting.   Genitourinary: Negative for dysuria.   Musculoskeletal: Negative for arthralgias.   Skin: Negative for rash.    Neurological: Negative for light-headedness.   Psychiatric/Behavioral: Negative for agitation.       Physical Exam   BP: (!) 140/92  Pulse: 108  Heart Rate: 108  Temp: 99  F (37.2  C)  Resp: 14  Weight: 54.9 kg (121 lb)  SpO2: 95 %      Physical Exam   Constitutional: She is oriented to person, place, and time. She appears well-developed and well-nourished. No distress.   HENT:   Head: Normocephalic and atraumatic.   Eyes: Conjunctivae are normal.   Neck: Neck supple.   Cardiovascular: Normal rate.    Pulmonary/Chest: Effort normal.   Neurological: She is alert and oriented to person, place, and time.   Skin: Skin is warm and dry. She is not diaphoretic.   Psychiatric: Her mood appears anxious.   Nursing note and vitals reviewed.      ED Course     ED Course     Procedures      Results for orders placed or performed during the hospital encounter of 07/21/18 (from the past 24 hour(s))   HCG qualitative urine   Result Value Ref Range    HCG Qual Urine Negative NEG^Negative   *UA reflex to Microscopic   Result Value Ref Range    Color Urine Yellow     Appearance Urine Clear     Glucose Urine Negative NEG^Negative mg/dL    Bilirubin Urine Negative NEG^Negative    Ketones Urine Negative NEG^Negative mg/dL    Specific Gravity Urine <1.005 1.000 - 1.030    Blood Urine Negative NEG^Negative    pH Urine 7.0 5.0 - 9.0 pH    Protein Albumin Urine Negative NEG^Negative mg/dL    Urobilinogen Urine 0.2 0.2 - 1.0 EU/dL    Nitrite Urine Negative NEG^Negative    Leukocyte Esterase Urine Negative NEG^Negative    Source Midstream Urine    Drug of Abuse Screen Urine GH   Result Value Ref Range    Amphetamine Qual Urine Not Detected NDET^Not Detected    Benzodiazepine Qual Urine Not Detected NDET^Not Detected    Cocaine Qual Urine Not Detected NDET^Not Detected    Methadone Qual Urine Not Detected NDET^Not Detected    PCP Qual Urine Not Detected NDET^Not Detected    Opiates Qualitative Urine Not Detected NDET^Not Detected    Oxycodone  Qualitative Urine Presumptive positive-Unconfirmed result (A) NDET^Not Detected ng/mL    Propoxyphene Qualitative Urine Not Detected NDET^Not Detected ng/mL    Tricyclic Antidepressants Qual Urine Not Detected NDET^Not Detected ng/mL    Methamphetamine Qualitative Urine Not Detected NDET^Not Detected ng/mL    Barbiturates Qual Urine Not Detected NDET^Not Detected    Cannabinoids Qualitative Urine Presumptive positive-Unconfirmed result (A) NDET^Not Detected ng/mL    Buprenorphine Qualitative Urine Not Detected NDET^Not Detected ng/mL       Medications   LORazepam (ATIVAN) tablet 1 mg (1 mg Oral Given 7/21/18 1724)       Assessments & Plan (with Medical Decision Making)     I have reviewed the nursing notes.    I have reviewed the findings, diagnosis, plan and need for follow up with the patient.  I did ask crisis response team to come in to help me with the patient. The patient's tox screen was positive for oxycodone.  Patient has no services in the area. Crisis response team does not feel that the patient is suicidal or a danger to herself. I agree with this. She will be discharged to home. They have arranged services that they will try to get arranged right away on Monday morning. She will stay with her grandmother tonight as they have been getting along better then she and her mother. Crisis response team will check in with her tomorrow as well.    New Prescriptions    No medications on file       Final diagnoses:   Substance abuse       7/21/2018   [facility]     Jasper Weber MD  07/21/18 0057

## 2018-07-21 NOTE — ED AVS SNAPSHOT
[facility]    [address]    [place] [address]    Phone:  [phone]    Fax:  [phone]                                       Jana Beyer   MRN: [id number]    Department:  [facility]   Date of Visit:  7/21/2018           After Visit Summary Signature Page     I have received my discharge instructions, and my questions have been answered. I have discussed any challenges I see with this plan with the nurse or doctor.    ..........................................................................................................................................  Patient/Patient Representative Signature      ..........................................................................................................................................  Patient Representative Print Name and Relationship to Patient    ..................................................               ................................................  Date                                            Time    ..........................................................................................................................................  Reviewed by Signature/Title    ...................................................              ..............................................  Date                                                            Time

## 2018-07-21 NOTE — ED NOTES
Spoke with CRT re: patient. She will be in shortly. Asked family to leave room for short period to give patient a chance to settle down. Patient aware of crt coming in to see her.

## 2018-07-21 NOTE — ED TRIAGE NOTES
Presents with her mom who states that patient has been pilfering a friends meds who is on narcotics and benzos. Patient states that is not true, and that she has been using a friends xanax. Presents sleepy eyed and tearful. Denies suicidal ideation. States just had a bad day yesterday and needed xanax. She states that it helped her.

## 2018-07-21 NOTE — DISCHARGE INSTRUCTIONS
Follow-up with crisis response team and with the services that you discussed with them. Return if worse.

## 2018-07-21 NOTE — ED NOTES
Jana Beyer is discharged home self care via private vehicle to her grandmother's home.   Discharge planning gone over with the patient. She had no further questions or concerns.   Juan Smith RN 6:34 PM 07/21/18

## 2018-07-21 NOTE — ED TRIAGE NOTES
[place]-SUICIDE SEVERITY RATING SCALE   Screen with Triage Points for Emergency Department      Ask questions that are bolded and underlined.   Past  month   Ask Questions 1 and 2 YES NO   1)  Have you wished you were dead or wished you could go to sleep and not wake up?   no   2)  Have you actually had any thoughts of killing yourself?   no   If YES to 2, ask questions 3, 4, 5, and 6.  If NO to 2, go directly to question 6.   3)  Have you been thinking about how you might do this?   E.g.  I thought about taking an overdose but I never made a specific plan as to when where or how I would actually do it .and I would never go through with it.    no   4)  Have you had these thoughts and had some intention of acting on them?   As opposed to  I have the thoughts but I definitely will not do anything about them.       5)  Have you started to work out or worked out the details of how to kill yourself? Do you intend to carry out this plan?      6)  Have you ever done anything, started to do anything, or prepared to do anything to end your life?  Examples: Collected pills, obtained a gun, gave away valuables, wrote a will or suicide note, took out pills but didn t swallow any, held a gun but changed your mind or it was grabbed from your hand, went to the roof but didn t jump; or actually took pills, tried to shoot yourself, cut yourself, tried to hang yourself, etc.    If YES, ask: Was this within the past three months?  Lifetime         Past 3 Months     no   Item 1:  Behavioral Health Referral at Discharge  Item 2:  Behavioral Health Referral at Discharge   Item 3:  Behavioral Health Consult (Psychiatric Nurse/) and consider Patient Safety Precautions  Item 4:  Immediate Notification of Physician and/or Behavioral Health and Patient Safety Precautions   Item 5:  Immediate Notification of Physician and/or Behavioral Health and Patient Safety Precautions  Item 6:  Over 3 months ago: Behavioral Health Consult  (Psychiatric Nurse/) and consider Patient Safety Precautions  OR  Item 6:  3 months ago or less: Immediate Notification of Physician and/or Behavioral Health and Patient Safety Precautions

## 2018-07-25 NOTE — TELEPHONE ENCOUNTER
Called and spoke with ignacio. [facility] states that patient has never came to  rx and rx was placed back on shelf. This encounter will be closed.   Martine Gutierrez LPN............. July 25, 2018 11:00 AM

## 2019-01-17 ENCOUNTER — OFFICE VISIT (OUTPATIENT)
Dept: INTERNAL MEDICINE | Facility: OTHER | Age: 24
End: 2019-01-17
Attending: INTERNAL MEDICINE
Payer: COMMERCIAL

## 2019-01-17 ENCOUNTER — HOSPITAL ENCOUNTER (OUTPATIENT)
Dept: GENERAL RADIOLOGY | Facility: OTHER | Age: 24
Discharge: HOME | End: 2019-01-17
Attending: INTERNAL MEDICINE | Admitting: INTERNAL MEDICINE
Payer: COMMERCIAL

## 2019-01-17 VITALS
BODY MASS INDEX: 20.74 KG/M2 | HEIGHT: 65 IN | HEART RATE: 76 BPM | SYSTOLIC BLOOD PRESSURE: 114 MMHG | DIASTOLIC BLOOD PRESSURE: 66 MMHG | WEIGHT: 124.5 LBS

## 2019-01-17 DIAGNOSIS — M25.561 LATERAL KNEE PAIN, RIGHT: ICD-10-CM

## 2019-01-17 DIAGNOSIS — M25.561 LATERAL KNEE PAIN, RIGHT: Primary | ICD-10-CM

## 2019-01-17 PROCEDURE — G0463 HOSPITAL OUTPT CLINIC VISIT: HCPCS

## 2019-01-17 PROCEDURE — G0463 HOSPITAL OUTPT CLINIC VISIT: HCPCS | Mod: 25

## 2019-01-17 PROCEDURE — 99213 OFFICE O/P EST LOW 20 MIN: CPT | Performed by: INTERNAL MEDICINE

## 2019-01-17 PROCEDURE — 73562 X-RAY EXAM OF KNEE 3: CPT | Mod: RT

## 2019-01-17 ASSESSMENT — ANXIETY QUESTIONNAIRES
2. NOT BEING ABLE TO STOP OR CONTROL WORRYING: NOT AT ALL
5. BEING SO RESTLESS THAT IT IS HARD TO SIT STILL: NOT AT ALL
6. BECOMING EASILY ANNOYED OR IRRITABLE: NOT AT ALL
IF YOU CHECKED OFF ANY PROBLEMS ON THIS QUESTIONNAIRE, HOW DIFFICULT HAVE THESE PROBLEMS MADE IT FOR YOU TO DO YOUR WORK, TAKE CARE OF THINGS AT HOME, OR GET ALONG WITH OTHER PEOPLE: NOT DIFFICULT AT ALL
7. FEELING AFRAID AS IF SOMETHING AWFUL MIGHT HAPPEN: NOT AT ALL
GAD7 TOTAL SCORE: 0
3. WORRYING TOO MUCH ABOUT DIFFERENT THINGS: NOT AT ALL
1. FEELING NERVOUS, ANXIOUS, OR ON EDGE: NOT AT ALL

## 2019-01-17 ASSESSMENT — MIFFLIN-ST. JEOR: SCORE: 1307.67

## 2019-01-17 ASSESSMENT — ENCOUNTER SYMPTOMS: SHORTNESS OF BREATH: 0

## 2019-01-17 ASSESSMENT — PAIN SCALES - GENERAL: PAINLEVEL: MILD PAIN (2)

## 2019-01-17 ASSESSMENT — PATIENT HEALTH QUESTIONNAIRE - PHQ9
SUM OF ALL RESPONSES TO PHQ QUESTIONS 1-9: 0
5. POOR APPETITE OR OVEREATING: NOT AT ALL

## 2019-01-17 NOTE — PATIENT INSTRUCTIONS
Continue ibuprofen and Tylenol as needed for pain.    Wear it right knee brace as needed --likely will benefit from use for the next 2-4 weeks.    Concerned about possible lateral knee retinaculum injury.    If symptoms are not improving over the next 2-4 weeks recommend orthopedic evaluation.

## 2019-01-17 NOTE — NURSING NOTE
"Patient presents to the clinic for right knee injury several weeks ago while sledding, patient reports pain with range of motion only.    Chief Complaint   Patient presents with     Musculoskeletal Problem       Initial /66 (BP Location: Right arm, Patient Position: Sitting, Cuff Size: Adult Regular)   Pulse 76   Ht 1.638 m (5' 4.5\")   Wt 56.5 kg (124 lb 8 oz)   LMP 01/02/2019   BMI 21.04 kg/m   Estimated body mass index is 21.04 kg/m  as calculated from the following:    Height as of this encounter: 1.638 m (5' 4.5\").    Weight as of this encounter: 56.5 kg (124 lb 8 oz).  Medication Reconciliation: complete    Deepali Glasgow LPN    "

## 2019-01-17 NOTE — PROGRESS NOTES
"Nursing Notes:   Deepali Glasgow LPN  1/17/2019  1:41 PM  Signed  Patient presents to the clinic for right knee injury several weeks ago while sledding, patient reports pain with range of motion only.    Chief Complaint   Patient presents with     Musculoskeletal Problem       Initial /66 (BP Location: Right arm, Patient Position: Sitting, Cuff Size: Adult Regular)   Pulse 76   Ht 1.638 m (5' 4.5\")   Wt 56.5 kg (124 lb 8 oz)   LMP 01/02/2019   BMI 21.04 kg/m    Estimated body mass index is 21.04 kg/m  as calculated from the following:    Height as of this encounter: 1.638 m (5' 4.5\").    Weight as of this encounter: 56.5 kg (124 lb 8 oz).  Medication Reconciliation: complete    Deepali Glasgow LPN    Nursing note reviewed with patient.  Accuracy and completeness verified.   Ms. Beyer is a 23 year old female who:  Patient presents with:  Musculoskeletal Problem      ICD-10-CM    1. Lateral knee pain, right M25.561 XR Knee Right 3 Views     order for DME     HPI  Patient comes in for evaluation of knee pain.  States that about 4 weeks ago she was sliding down a hill, she went through a fence after going really fast and ended up running into the side of a house.  She hit the house with her knee.    States that the knee was quite bruised initially.  The upper outer part of her knee was bruised.    She is having some pain with squatting and bending.  States that the knee will feel like it will buckle or pop or give out on her at times.  If she is standing and relaxing, the pain is quite minimal.  She has been using ibuprofen and Tylenol intermittently.    Has not tried a knee brace at all.    Functional Capacity: normally > 4 METS.   Review of Systems   Respiratory: Negative for shortness of breath.    Cardiovascular: Negative for chest pain.   Genitourinary: Negative for pelvic pain.   Musculoskeletal: Positive for gait problem.        Has lateral right knee pain just above the patella, worse with squatting " or bending down, better with rest.   Skin: Negative for rash.      SHANDA:   SHANDA-7 SCORE 12/6/2016 1/17/2019   Total Score 6 0     PHQ9:  PHQ-9 SCORE 5/13/2016 12/6/2016 1/17/2019   PHQ-9 Total Score 7 6 0       I have personally reviewed the past medical history, past surgical history, medications, allergies, family and social history as listed below.     No Known Allergies    Current Outpatient Medications   Medication Sig Dispense Refill     order for DME Equipment being ordered: Short hinged runners knee brace 1 each 0        Patient Active Problem List    Diagnosis Date Noted     Lateral knee pain, right 01/17/2019     Priority: Medium     Fibromyalgia 11/25/2017     Priority: Medium     Myofascial muscle pain 11/25/2017     Priority: Medium     Acne vulgaris 12/06/2016     Priority: Medium     Episodic mood disorder (H) 12/06/2016     Priority: Medium     Engages in binge consumption of alcohol 11/27/2016     Priority: Medium     History of suicide attempt 11/27/2016     Priority: Medium     Tetrahydrocannabinol (THC) use disorder, mild, abuse 05/25/2016     Priority: Medium     Tanning jo use 05/13/2016     Priority: Medium     Chlamydial infection 05/12/2015     Priority: Medium     Overview:   Treated with azithromycin 1g x 1 dose on 5/12/15.       Contraceptive management 02/05/2014     Priority: Medium     Allergic rhinitis 07/25/2011     Priority: Medium     Narcotic abuse, episodic (H) 07/21/2011     Priority: Medium     Overview:   THC noted on tox screen 5/2016 and 11/27/2016       Tobacco abuse 03/19/2011     Priority: Medium     Past Medical History:   Diagnosis Date     Alcohol use with intoxication (H)      08/22/2009,Hospitalized w/acute alcohol intoxication     Alcohol use with intoxication (H)     11/27/2016     Ectopic pregnancy without intrauterine pregnancy     11/2/2012     Encounter for elective termination of pregnancy     4/29/2011     Encounter for routine checking of intrauterine  contraceptive device     11/15/2011     Hemorrhage of anus and rectum     7/11/2011     Nicotine dependence, uncomplicated     3/19/2011     Other psychoactive substance abuse, uncomplicated (H)     7/21/2011,THC noted on tox screen 5/2016 and 11/27/2016     Poisoning by unspecified drugs, medicaments and biological substances, intentional self-harm, initial encounter (H)     11/27/2016     Polyhydramnios     11/14/2013     Tubal pregnancy without intrauterine pregnancy     11/2/2012     Past Surgical History:   Procedure Laterality Date     SALPINGO-OOPHORECTOMY BILATERAL      11/02/12,Partial Left ,ectopic. Laprascopic salpingectomy     Social History     Socioeconomic History     Marital status: Single     Spouse name: None     Number of children: None     Years of education: None     Highest education level: None   Social Needs     Financial resource strain: None     Food insecurity - worry: None     Food insecurity - inability: None     Transportation needs - medical: None     Transportation needs - non-medical: None   Occupational History     None   Tobacco Use     Smoking status: Current Every Day Smoker     Packs/day: 0.50     Years: 4.00     Pack years: 2.00     Types: Cigarettes     Smokeless tobacco: Never Used   Substance and Sexual Activity     Alcohol use: Yes     Alcohol/week: 0.0 oz     Drug use: No     Comment: Drug use: Yes- Snorting Fentanyl / Heroin x few months, as of 11/25/2017     Sexual activity: Yes     Partners: Male     Comment: Birth control method: high risk sexual activity   Other Topics Concern     Parent/sibling w/ CABG, MI or angioplasty before 65F 55M? Not Asked   Social History Narrative    Mother - Saadia Ness  Currently living with maternal grandparents. Had been living with mom.   Son with her, who spends weekends with dad (mom's ex-boyfriend).     Family History   Problem Relation Age of Onset     Other - See Comments Brother         ADHD       EXAM:   Vitals:     "01/17/19 1330   BP: 114/66   BP Location: Right arm   Patient Position: Sitting   Cuff Size: Adult Regular   Pulse: 76   Weight: 56.5 kg (124 lb 8 oz)   Height: 1.638 m (5' 4.5\")       Current Pain Score: Mild Pain (2)     BP Readings from Last 3 Encounters:   01/17/19 114/66   07/21/18 (!) 140/92   07/15/18 138/68      Wt Readings from Last 3 Encounters:   01/17/19 56.5 kg (124 lb 8 oz)   07/21/18 54.9 kg (121 lb)   07/15/18 55.4 kg (122 lb 1.6 oz)      Estimated body mass index is 21.04 kg/m  as calculated from the following:    Height as of this encounter: 1.638 m (5' 4.5\").    Weight as of this encounter: 56.5 kg (124 lb 8 oz).     Physical Exam   Constitutional: She appears well-developed and well-nourished.   Cardiovascular: Normal rate and intact distal pulses.   Musculoskeletal: She exhibits tenderness. She exhibits no edema or deformity.   Left knee evaluation is normal.    Right knee evaluation:  Lockman's and anterior drawer testing are intact.  Has tenderness to palpation of superior lateral patella.  No joint line tenderness.  Mild effusion.  No tenderness to palpation of medial and lateral collateral ligaments.  Deep bending of the knee/squatting is limited due to pain.   Neurological: She is alert.   Skin: Skin is warm and dry. No rash noted.   Psychiatric: She has a normal mood and affect.      Procedures   INVESTIGATIONS:  Results for orders placed or performed during the hospital encounter of 07/21/18   HCG qualitative urine   Result Value Ref Range    HCG Qual Urine Negative NEG^Negative   *UA reflex to Microscopic   Result Value Ref Range    Color Urine Yellow     Appearance Urine Clear     Glucose Urine Negative NEG^Negative mg/dL    Bilirubin Urine Negative NEG^Negative    Ketones Urine Negative NEG^Negative mg/dL    Specific Gravity Urine <1.005 1.000 - 1.030    Blood Urine Negative NEG^Negative    pH Urine 7.0 5.0 - 9.0 pH    Protein Albumin Urine Negative NEG^Negative mg/dL    Urobilinogen " Urine 0.2 0.2 - 1.0 EU/dL    Nitrite Urine Negative NEG^Negative    Leukocyte Esterase Urine Negative NEG^Negative    Source Midstream Urine    Drug of Abuse Screen Urine GH   Result Value Ref Range    Amphetamine Qual Urine Not Detected NDET^Not Detected    Benzodiazepine Qual Urine Not Detected NDET^Not Detected    Cocaine Qual Urine Not Detected NDET^Not Detected    Methadone Qual Urine Not Detected NDET^Not Detected    PCP Qual Urine Not Detected NDET^Not Detected    Opiates Qualitative Urine Not Detected NDET^Not Detected    Oxycodone Qualitative Urine Presumptive positive-Unconfirmed result (A) NDET^Not Detected ng/mL    Propoxyphene Qualitative Urine Not Detected NDET^Not Detected ng/mL    Tricyclic Antidepressants Qual Urine Not Detected NDET^Not Detected ng/mL    Methamphetamine Qualitative Urine Not Detected NDET^Not Detected ng/mL    Barbiturates Qual Urine Not Detected NDET^Not Detected    Cannabinoids Qualitative Urine Presumptive positive-Unconfirmed result (A) NDET^Not Detected ng/mL    Buprenorphine Qualitative Urine Not Detected NDET^Not Detected ng/mL       ASSESSMENT AND PLAN:  Problem List Items Addressed This Visit        Nervous and Auditory    Lateral knee pain, right - Primary    Relevant Medications    order for DME    Other Relevant Orders    XR Knee Right 3 Views        -- Expected clinical course discussed    -- Medications and their side effects discussed    Patient Instructions   Continue ibuprofen and Tylenol as needed for pain.    Wear it right knee brace as needed --likely will benefit from use for the next 2-4 weeks.    Concerned about possible lateral knee retinaculum injury.    If symptoms are not improving over the next 2-4 weeks recommend orthopedic evaluation.    Kimo Haji MD  Internal Medicine  [facility]     Portions of this note were dictated using speech recognition software. The note has been proofread but errors in the text may have been  overlooked. Please contact me if there are any concerns regarding the accuracy of the dictation.

## 2019-01-18 ASSESSMENT — ANXIETY QUESTIONNAIRES: GAD7 TOTAL SCORE: 0

## 2019-04-17 ENCOUNTER — OFFICE VISIT (OUTPATIENT)
Dept: INTERNAL MEDICINE | Facility: OTHER | Age: 24
End: 2019-04-17
Attending: INTERNAL MEDICINE
Payer: COMMERCIAL

## 2019-04-17 VITALS
SYSTOLIC BLOOD PRESSURE: 104 MMHG | TEMPERATURE: 96.7 F | OXYGEN SATURATION: 96 % | DIASTOLIC BLOOD PRESSURE: 70 MMHG | BODY MASS INDEX: 19.94 KG/M2 | HEART RATE: 80 BPM | RESPIRATION RATE: 20 BRPM | WEIGHT: 118 LBS

## 2019-04-17 DIAGNOSIS — Z72.0 TOBACCO USE: ICD-10-CM

## 2019-04-17 DIAGNOSIS — J20.9 ACUTE PURULENT BRONCHITIS: Primary | ICD-10-CM

## 2019-04-17 DIAGNOSIS — R06.2 WHEEZING: ICD-10-CM

## 2019-04-17 PROCEDURE — 99214 OFFICE O/P EST MOD 30 MIN: CPT | Performed by: INTERNAL MEDICINE

## 2019-04-17 PROCEDURE — G0463 HOSPITAL OUTPT CLINIC VISIT: HCPCS

## 2019-04-17 RX ORDER — PREDNISONE 10 MG/1
10 TABLET ORAL DAILY
Qty: 10 TABLET | Refills: 0 | Status: SHIPPED | OUTPATIENT
Start: 2019-04-17 | End: 2019-04-27

## 2019-04-17 RX ORDER — ALBUTEROL SULFATE 90 UG/1
1-2 AEROSOL, METERED RESPIRATORY (INHALATION) EVERY 4 HOURS PRN
Qty: 8.5 G | Refills: 1 | Status: SHIPPED | OUTPATIENT
Start: 2019-04-17 | End: 2021-02-25

## 2019-04-17 RX ORDER — BENZONATATE 100 MG/1
100 CAPSULE ORAL 3 TIMES DAILY PRN
Qty: 90 CAPSULE | Refills: 1 | Status: SHIPPED | OUTPATIENT
Start: 2019-04-17 | End: 2021-02-25

## 2019-04-17 RX ORDER — AZITHROMYCIN 250 MG/1
TABLET, FILM COATED ORAL
Qty: 6 TABLET | Refills: 0 | Status: SHIPPED | OUTPATIENT
Start: 2019-04-17 | End: 2019-04-22

## 2019-04-17 ASSESSMENT — ENCOUNTER SYMPTOMS
CONFUSION: 0
WOUND: 0
WHEEZING: 1
BACK PAIN: 0
BRUISES/BLEEDS EASILY: 0
SINUS PRESSURE: 1
CHILLS: 1
SORE THROAT: 1
FATIGUE: 1
SHORTNESS OF BREATH: 1
CHEST TIGHTNESS: 0
ABDOMINAL PAIN: 0
HEMATURIA: 0
FEVER: 0
COUGH: 1
ADENOPATHY: 0
RHINORRHEA: 1

## 2019-04-17 ASSESSMENT — ANXIETY QUESTIONNAIRES
5. BEING SO RESTLESS THAT IT IS HARD TO SIT STILL: NOT AT ALL
GAD7 TOTAL SCORE: 0
6. BECOMING EASILY ANNOYED OR IRRITABLE: NOT AT ALL
1. FEELING NERVOUS, ANXIOUS, OR ON EDGE: NOT AT ALL
IF YOU CHECKED OFF ANY PROBLEMS ON THIS QUESTIONNAIRE, HOW DIFFICULT HAVE THESE PROBLEMS MADE IT FOR YOU TO DO YOUR WORK, TAKE CARE OF THINGS AT HOME, OR GET ALONG WITH OTHER PEOPLE: NOT DIFFICULT AT ALL
3. WORRYING TOO MUCH ABOUT DIFFERENT THINGS: NOT AT ALL
2. NOT BEING ABLE TO STOP OR CONTROL WORRYING: NOT AT ALL
7. FEELING AFRAID AS IF SOMETHING AWFUL MIGHT HAPPEN: NOT AT ALL

## 2019-04-17 ASSESSMENT — PAIN SCALES - GENERAL: PAINLEVEL: MODERATE PAIN (4)

## 2019-04-17 ASSESSMENT — PATIENT HEALTH QUESTIONNAIRE - PHQ9
5. POOR APPETITE OR OVEREATING: NOT AT ALL
SUM OF ALL RESPONSES TO PHQ QUESTIONS 1-9: 0

## 2019-04-17 NOTE — PROGRESS NOTES
"Nursing Notes:   Deepali Glasgow LPN  4/17/2019  9:26 AM  Signed  Patient presents to the clinic for concerns about fluid in both ears, head congestion, body aches, deep cough, post nasal drip and green nasal drainage over the past couple of weeks.  Patient denies any fevers at this time.    Chief Complaint   Patient presents with     Nose Problem       Initial /70 (BP Location: Right arm, Patient Position: Sitting, Cuff Size: Adult Regular)   Pulse 80   Temp 96.7  F (35.9  C) (Tympanic)   Resp 20   Wt 53.5 kg (118 lb)   LMP 04/07/2019 (Exact Date)   SpO2 96%   BMI 19.94 kg/m    Estimated body mass index is 19.94 kg/m  as calculated from the following:    Height as of 1/17/19: 1.638 m (5' 4.5\").    Weight as of this encounter: 53.5 kg (118 lb).  Medication Reconciliation: complete    Deepali Glasgow LPN    Nursing note reviewed with patient.  Accuracy and completeness verified.   Ms. Beyer is a 24 year old female who:  Patient presents with:  Nose Problem      ICD-10-CM    1. Acute purulent bronchitis J20.8 albuterol (PROAIR HFA/PROVENTIL HFA/VENTOLIN HFA) 108 (90 Base) MCG/ACT inhaler     predniSONE (DELTASONE) 10 MG tablet     azithromycin (ZITHROMAX) 250 MG tablet     benzonatate (TESSALON) 100 MG capsule   2. Wheezing R06.2 predniSONE (DELTASONE) 10 MG tablet   3. Tobacco use Z72.0 predniSONE (DELTASONE) 10 MG tablet     HPI  Patient presents for evaluation of 3 weeks of ongoing infectious symptoms.  Initially started out with mid facial pressure now more in the left face and ear.  Left ear is bothering her.    Denies dental pain.  No fevers but has been having warm and cold spells.  Some headache body aches.  Lots of fatigue.  This all got much worse about 4 or 5 days ago.    Has had her menstrual cycle.  She is not pregnant.    Having some glandular tenderness in the neck anteriorly and posteriorly.  A lot of postnasal drip, cough, phlegm.  Phlegm is yellow-green.    Has used ibuprofen Tylenol " Aleve and Mucinex.  These do help some but symptoms are persistent.    She was supposed to work with short shift today but due to illness and concerns about illness, presented to the clinic for evaluation.  She needs a work slip.    Examination is most consistent with acute purulent bronchitis.  Also probably has a bit of a sinus infection.  Start Zithromax, prednisone.  Tessalon Perles.  Encourage smoking cessation.  Having wheezing throughout bilaterally.    Follow-up advised for new or worsening symptoms, she expresses understanding.    Functional Capacity: > 4 METS.   Review of Systems   Constitutional: Positive for chills and fatigue. Negative for fever.   HENT: Positive for congestion, ear pain (  left ear), rhinorrhea, sinus pressure, sneezing and sore throat. Negative for ear discharge.    Eyes: Negative for visual disturbance.   Respiratory: Positive for cough, shortness of breath and wheezing. Negative for chest tightness.    Cardiovascular: Negative for chest pain.   Gastrointestinal: Negative for abdominal pain.   Genitourinary: Negative for hematuria.   Musculoskeletal: Negative for back pain.   Skin: Negative for rash and wound.   Neurological: Negative for syncope.   Hematological: Negative for adenopathy. Does not bruise/bleed easily.   Psychiatric/Behavioral: Negative for confusion.          SHANDA:   SHANDA-7 SCORE 12/6/2016 1/17/2019 4/17/2019   Total Score 6 0 0     PHQ9:  PHQ-9 SCORE 12/6/2016 1/17/2019 4/17/2019   PHQ-9 Total Score 6 0 0       I have personally reviewed the past medical history, past surgical history, medications, allergies, family and social history as listed below.     No Known Allergies    Current Outpatient Medications   Medication Sig Dispense Refill     albuterol (PROAIR HFA/PROVENTIL HFA/VENTOLIN HFA) 108 (90 Base) MCG/ACT inhaler Inhale 1-2 puffs into the lungs every 4 hours as needed for shortness of breath / dyspnea or wheezing 8.5 g 1     azithromycin (ZITHROMAX) 250 MG  tablet Take 2 tablets (500 mg) by mouth daily for 1 day, THEN 1 tablet (250 mg) daily for 4 days. 6 tablet 0     benzonatate (TESSALON) 100 MG capsule Take 1 capsule (100 mg) by mouth 3 times daily as needed for cough 90 capsule 1     order for DME Equipment being ordered: Short hinged runners knee brace 1 each 0     predniSONE (DELTASONE) 10 MG tablet Take 10 mg by mouth daily for 10 days. 10 tablet 0        Patient Active Problem List    Diagnosis Date Noted     Lateral knee pain, right 01/17/2019     Priority: Medium     Fibromyalgia 11/25/2017     Priority: Medium     Myofascial muscle pain 11/25/2017     Priority: Medium     Acne vulgaris 12/06/2016     Priority: Medium     Episodic mood disorder (H) 12/06/2016     Priority: Medium     Engages in binge consumption of alcohol 11/27/2016     Priority: Medium     History of suicide attempt 11/27/2016     Priority: Medium     Tetrahydrocannabinol (THC) use disorder, mild, abuse 05/25/2016     Priority: Medium     Tanning jo use 05/13/2016     Priority: Medium     Chlamydial infection 05/12/2015     Priority: Medium     Overview:   Treated with azithromycin 1g x 1 dose on 5/12/15.       Contraceptive management 02/05/2014     Priority: Medium     Allergic rhinitis 07/25/2011     Priority: Medium     Narcotic abuse, episodic (H) 07/21/2011     Priority: Medium     Overview:   THC noted on tox screen 5/2016 and 11/27/2016       Tobacco abuse 03/19/2011     Priority: Medium     Past Medical History:   Diagnosis Date     Alcohol use with intoxication (H)      08/22/2009,Hospitalized w/acute alcohol intoxication     Alcohol use with intoxication (H)     11/27/2016     Ectopic pregnancy without intrauterine pregnancy     11/2/2012     Encounter for elective termination of pregnancy     4/29/2011     Encounter for routine checking of intrauterine contraceptive device     11/15/2011     Hemorrhage of anus and rectum     7/11/2011     Nicotine dependence, uncomplicated      3/19/2011     Other psychoactive substance abuse, uncomplicated (H)     7/21/2011,THC noted on tox screen 5/2016 and 11/27/2016     Poisoning by unspecified drugs, medicaments and biological substances, intentional self-harm, initial encounter (H)     11/27/2016     Polyhydramnios     11/14/2013     Tubal pregnancy without intrauterine pregnancy     11/2/2012     Past Surgical History:   Procedure Laterality Date     SALPINGO-OOPHORECTOMY BILATERAL      11/02/12,Partial Left ,ectopic. Laprascopic salpingectomy     Social History     Socioeconomic History     Marital status: Single     Spouse name: None     Number of children: None     Years of education: None     Highest education level: None   Occupational History     None   Social Needs     Financial resource strain: None     Food insecurity:     Worry: None     Inability: None     Transportation needs:     Medical: None     Non-medical: None   Tobacco Use     Smoking status: Current Every Day Smoker     Packs/day: 0.50     Years: 4.00     Pack years: 2.00     Types: Cigarettes     Smokeless tobacco: Never Used   Substance and Sexual Activity     Alcohol use: Yes     Alcohol/week: 0.0 oz     Comment: once in a while     Drug use: No     Types: Fentanyl, Opiates, Other     Comment: FORMER Yes- Snorting Fentanyl / Heroin x few months, as of 11/25/2017     Sexual activity: Yes     Partners: Male     Comment: Birth control method: high risk sexual activity   Lifestyle     Physical activity:     Days per week: None     Minutes per session: None     Stress: None   Relationships     Social connections:     Talks on phone: None     Gets together: None     Attends Shinto service: None     Active member of club or organization: None     Attends meetings of clubs or organizations: None     Relationship status: None     Intimate partner violence:     Fear of current or ex partner: None     Emotionally abused: None     Physically abused: None     Forced sexual activity:  "None   Other Topics Concern     Parent/sibling w/ CABG, MI or angioplasty before 65F 55M? Not Asked   Social History Narrative    Mother - Saadia Ness  Currently living with maternal grandparents. Had been living with mom.   Son with her, who spends weekends with dad (mom's ex-boyfriend).     Family History   Problem Relation Age of Onset     Other - See Comments Brother         ADHD       EXAM:   Vitals:    04/17/19 0908   BP: 104/70   BP Location: Right arm   Patient Position: Sitting   Cuff Size: Adult Regular   Pulse: 80   Resp: 20   Temp: 96.7  F (35.9  C)   TempSrc: Tympanic   SpO2: 96%   Weight: 53.5 kg (118 lb)       Current Pain Score: Moderate Pain (4)     BP Readings from Last 3 Encounters:   04/17/19 104/70   01/17/19 114/66   07/21/18 (!) 140/92      Wt Readings from Last 3 Encounters:   04/17/19 53.5 kg (118 lb)   01/17/19 56.5 kg (124 lb 8 oz)   07/21/18 54.9 kg (121 lb)      Estimated body mass index is 19.94 kg/m  as calculated from the following:    Height as of 1/17/19: 1.638 m (5' 4.5\").    Weight as of this encounter: 53.5 kg (118 lb).     Physical Exam   Constitutional: She appears well-developed and well-nourished.   HENT:   Head: Normocephalic and atraumatic.   Mouth/Throat: Oropharynx is clear and moist. No oropharyngeal exudate.   + moderate tenderness to percussion left maxillary sinus    No pharyngeal exudates.   + clear rhinorrhea.     Left TM is moderately erythematous     Eyes: Conjunctivae are normal. No scleral icterus.   Neck: No tracheal deviation present. No thyromegaly present.   Cardiovascular: Normal rate and regular rhythm.   Pulmonary/Chest: Effort normal. She has wheezes. She has rales.   Frequent cough   Abdominal: Soft.   Musculoskeletal: Normal range of motion.   Lymphadenopathy:     She has no cervical adenopathy.   Neurological: She is alert.   Skin: Skin is warm and dry. No rash noted.   Psychiatric: She has a normal mood and affect.        Procedures "   INVESTIGATIONS:  Results for orders placed or performed during the hospital encounter of 01/17/19   XR Knee Right 3 Views    Narrative    PROCEDURE: XR KNEE RT 3 VW 1/17/2019 1:57 PM    HISTORY: right lateral patella  / knee pain ---- please get sunrise  view ? retinaculum injury; Lateral knee pain, right    COMPARISONS: None.    TECHNIQUE: 3 views.    FINDINGS: No acute fracture or dislocation is seen. There is no  significant joint effusion and there is no focal bone lesion.         Impression    IMPRESSION: No acute bony abnormality.    JULIANA MACE MD       ASSESSMENT AND PLAN:  Problem List Items Addressed This Visit     None      Visit Diagnoses     Acute purulent bronchitis    -  Primary    Relevant Medications    albuterol (PROAIR HFA/PROVENTIL HFA/VENTOLIN HFA) 108 (90 Base) MCG/ACT inhaler    predniSONE (DELTASONE) 10 MG tablet    azithromycin (ZITHROMAX) 250 MG tablet    benzonatate (TESSALON) 100 MG capsule    Wheezing        Relevant Medications    albuterol (PROAIR HFA/PROVENTIL HFA/VENTOLIN HFA) 108 (90 Base) MCG/ACT inhaler    predniSONE (DELTASONE) 10 MG tablet    Tobacco use        Relevant Medications    predniSONE (DELTASONE) 10 MG tablet        very strongly urged to quit smoking to reduce cardiovascular risk  -- Expected clinical course discussed    -- Medications and their side effects discussed    Patient Instructions   1. Acute purulent bronchitis  START:   - albuterol (PROAIR HFA/PROVENTIL HFA/VENTOLIN HFA) 108 (90 Base) MCG/ACT inhaler; Inhale 1-2 puffs into the lungs every 4 hours as needed for shortness of breath / dyspnea or wheezing  Dispense: 8.5 g; Refill: 1  - predniSONE (DELTASONE) 10 MG tablet; Take 10 mg by mouth daily for 10 days.  Dispense: 10 tablet; Refill: 0  - azithromycin (ZITHROMAX) 250 MG tablet; Take 2 tablets (500 mg) by mouth daily for 1 day, THEN 1 tablet (250 mg) daily for 4 days.  Dispense: 6 tablet; Refill: 0  - benzonatate (TESSALON) 100 MG capsule; Take 1  capsule (100 mg) by mouth 3 times daily as needed for cough  Dispense: 90 capsule; Refill: 1    2. Wheezing  3. Tobacco use    START:   - predniSONE (DELTASONE) 10 MG tablet; Take 10 mg by mouth daily for 10 days.  Dispense: 10 tablet; Refill: 0      Quit smoking.   Return as needed for follow-up with Dr. Haji.    Clinic : [phone]  Appointment line: [phone]     Kimo Haji MD  Internal Medicine  [facility]     Portions of this note were dictated using speech recognition software. The note has been proofread but errors in the text may have been overlooked. Please contact me if there are any concerns regarding the accuracy of the dictation.

## 2019-04-17 NOTE — LETTER
April 17, 2019      Jana Beyer  [address] [address]        To Whom It May Concern:    Jana Beyer was seen in our clinic 4/17/2019. Please excuse from work today due to acute illness.   She may return to work without restrictions 4/18/2018.       Sincerely,        Kimo Haji MD

## 2019-04-17 NOTE — PATIENT INSTRUCTIONS
1. Acute purulent bronchitis  START:   - albuterol (PROAIR HFA/PROVENTIL HFA/VENTOLIN HFA) 108 (90 Base) MCG/ACT inhaler; Inhale 1-2 puffs into the lungs every 4 hours as needed for shortness of breath / dyspnea or wheezing  Dispense: 8.5 g; Refill: 1  - predniSONE (DELTASONE) 10 MG tablet; Take 10 mg by mouth daily for 10 days.  Dispense: 10 tablet; Refill: 0  - azithromycin (ZITHROMAX) 250 MG tablet; Take 2 tablets (500 mg) by mouth daily for 1 day, THEN 1 tablet (250 mg) daily for 4 days.  Dispense: 6 tablet; Refill: 0  - benzonatate (TESSALON) 100 MG capsule; Take 1 capsule (100 mg) by mouth 3 times daily as needed for cough  Dispense: 90 capsule; Refill: 1    2. Wheezing  3. Tobacco use    START:   - predniSONE (DELTASONE) 10 MG tablet; Take 10 mg by mouth daily for 10 days.  Dispense: 10 tablet; Refill: 0      Quit smoking.   Return as needed for follow-up with Dr. Haji.    Clinic : [phone]  Appointment line: [phone]

## 2019-04-17 NOTE — NURSING NOTE
"Patient presents to the clinic for concerns about fluid in both ears, head congestion, body aches, deep cough, post nasal drip and green nasal drainage over the past couple of weeks.  Patient denies any fevers at this time.    Chief Complaint   Patient presents with     Nose Problem       Initial /70 (BP Location: Right arm, Patient Position: Sitting, Cuff Size: Adult Regular)   Pulse 80   Temp 96.7  F (35.9  C) (Tympanic)   Resp 20   Wt 53.5 kg (118 lb)   LMP 04/07/2019 (Exact Date)   SpO2 96%   BMI 19.94 kg/m   Estimated body mass index is 19.94 kg/m  as calculated from the following:    Height as of 1/17/19: 1.638 m (5' 4.5\").    Weight as of this encounter: 53.5 kg (118 lb).  Medication Reconciliation: complete    Deepali Glasgow LPN    "

## 2019-04-18 ASSESSMENT — ANXIETY QUESTIONNAIRES: GAD7 TOTAL SCORE: 0

## 2021-02-11 ENCOUNTER — ALLIED HEALTH/NURSE VISIT (OUTPATIENT)
Dept: FAMILY MEDICINE | Facility: OTHER | Age: 26
End: 2021-02-11
Attending: FAMILY MEDICINE
Payer: COMMERCIAL

## 2021-02-11 DIAGNOSIS — Z20.822 COVID-19 RULED OUT: Primary | ICD-10-CM

## 2021-02-11 PROCEDURE — C9803 HOPD COVID-19 SPEC COLLECT: HCPCS

## 2021-02-11 PROCEDURE — U0005 INFEC AGEN DETEC AMPLI PROBE: HCPCS | Mod: ZL | Performed by: FAMILY MEDICINE

## 2021-02-11 PROCEDURE — U0003 INFECTIOUS AGENT DETECTION BY NUCLEIC ACID (DNA OR RNA); SEVERE ACUTE RESPIRATORY SYNDROME CORONAVIRUS 2 (SARS-COV-2) (CORONAVIRUS DISEASE [COVID-19]), AMPLIFIED PROBE TECHNIQUE, MAKING USE OF HIGH THROUGHPUT TECHNOLOGIES AS DESCRIBED BY CMS-2020-01-R: HCPCS | Mod: ZL | Performed by: FAMILY MEDICINE

## 2021-02-12 LAB
SARS-COV-2 RNA RESP QL NAA+PROBE: NOT DETECTED
SPECIMEN SOURCE: NORMAL

## 2021-02-25 ENCOUNTER — HOSPITAL ENCOUNTER (OUTPATIENT)
Dept: GENERAL RADIOLOGY | Facility: OTHER | Age: 26
End: 2021-02-25
Attending: NURSE PRACTITIONER
Payer: COMMERCIAL

## 2021-02-25 ENCOUNTER — TELEPHONE (OUTPATIENT)
Dept: FAMILY MEDICINE | Facility: OTHER | Age: 26
End: 2021-02-25

## 2021-02-25 ENCOUNTER — OFFICE VISIT (OUTPATIENT)
Dept: FAMILY MEDICINE | Facility: OTHER | Age: 26
End: 2021-02-25
Attending: NURSE PRACTITIONER
Payer: COMMERCIAL

## 2021-02-25 VITALS
TEMPERATURE: 97.6 F | RESPIRATION RATE: 17 BRPM | DIASTOLIC BLOOD PRESSURE: 78 MMHG | OXYGEN SATURATION: 98 % | HEART RATE: 108 BPM | BODY MASS INDEX: 22.16 KG/M2 | SYSTOLIC BLOOD PRESSURE: 122 MMHG | WEIGHT: 133 LBS | HEIGHT: 65 IN

## 2021-02-25 DIAGNOSIS — L08.9 FINGER INFECTION: ICD-10-CM

## 2021-02-25 DIAGNOSIS — F15.10 METHAMPHETAMINE ABUSE (H): ICD-10-CM

## 2021-02-25 DIAGNOSIS — Z13.29 SCREENING FOR THYROID DISORDER: ICD-10-CM

## 2021-02-25 DIAGNOSIS — Z00.00 ROUTINE HISTORY AND PHYSICAL EXAMINATION OF ADULT: Primary | ICD-10-CM

## 2021-02-25 DIAGNOSIS — Z12.4 CERVICAL CANCER SCREENING: ICD-10-CM

## 2021-02-25 DIAGNOSIS — Z72.51 HIGH RISK HETEROSEXUAL BEHAVIOR: ICD-10-CM

## 2021-02-25 LAB
ALBUMIN SERPL-MCNC: 4.4 G/DL (ref 3.5–5.7)
ALP SERPL-CCNC: 67 U/L (ref 34–104)
ALT SERPL W P-5'-P-CCNC: 14 U/L (ref 7–52)
ANION GAP SERPL CALCULATED.3IONS-SCNC: 7 MMOL/L (ref 3–14)
AST SERPL W P-5'-P-CCNC: 15 U/L (ref 13–39)
BASOPHILS # BLD AUTO: 0.1 10E9/L (ref 0–0.2)
BASOPHILS NFR BLD AUTO: 0.5 %
BILIRUB SERPL-MCNC: 0.3 MG/DL (ref 0.3–1)
BUN SERPL-MCNC: 10 MG/DL (ref 7–25)
C TRACH DNA SPEC QL NAA+PROBE: NOT DETECTED
CALCIUM SERPL-MCNC: 9.4 MG/DL (ref 8.6–10.3)
CHLORIDE SERPL-SCNC: 105 MMOL/L (ref 98–107)
CO2 SERPL-SCNC: 28 MMOL/L (ref 21–31)
CREAT SERPL-MCNC: 0.7 MG/DL (ref 0.6–1.2)
DIFFERENTIAL METHOD BLD: NORMAL
EOSINOPHIL # BLD AUTO: 0 10E9/L (ref 0–0.7)
EOSINOPHIL NFR BLD AUTO: 0.4 %
ERYTHROCYTE [DISTWIDTH] IN BLOOD BY AUTOMATED COUNT: 12.8 % (ref 10–15)
GFR SERPL CREATININE-BSD FRML MDRD: >90 ML/MIN/{1.73_M2}
GLUCOSE SERPL-MCNC: 79 MG/DL (ref 70–105)
HCT VFR BLD AUTO: 42.8 % (ref 35–47)
HGB BLD-MCNC: 14.3 G/DL (ref 11.7–15.7)
IMM GRANULOCYTES # BLD: 0 10E9/L (ref 0–0.4)
IMM GRANULOCYTES NFR BLD: 0.4 %
LYMPHOCYTES # BLD AUTO: 2.4 10E9/L (ref 0.8–5.3)
LYMPHOCYTES NFR BLD AUTO: 25.3 %
MCH RBC QN AUTO: 29.4 PG (ref 26.5–33)
MCHC RBC AUTO-ENTMCNC: 33.4 G/DL (ref 31.5–36.5)
MCV RBC AUTO: 88 FL (ref 78–100)
MONOCYTES # BLD AUTO: 0.5 10E9/L (ref 0–1.3)
MONOCYTES NFR BLD AUTO: 5.4 %
N GONORRHOEA DNA SPEC QL NAA+PROBE: NOT DETECTED
NEUTROPHILS # BLD AUTO: 6.3 10E9/L (ref 1.6–8.3)
NEUTROPHILS NFR BLD AUTO: 68 %
PLATELET # BLD AUTO: 266 10E9/L (ref 150–450)
POTASSIUM SERPL-SCNC: 4.1 MMOL/L (ref 3.5–5.1)
PROT SERPL-MCNC: 6.5 G/DL (ref 6.4–8.9)
RBC # BLD AUTO: 4.87 10E12/L (ref 3.8–5.2)
SODIUM SERPL-SCNC: 140 MMOL/L (ref 134–144)
SPECIMEN SOURCE: NORMAL
SPECIMEN SOURCE: NORMAL
TSH SERPL DL<=0.05 MIU/L-ACNC: 0.71 IU/ML (ref 0.34–5.6)
WBC # BLD AUTO: 9.3 10E9/L (ref 4–11)
WET PREP SPEC: NORMAL

## 2021-02-25 PROCEDURE — 80053 COMPREHEN METABOLIC PANEL: CPT | Mod: ZL | Performed by: NURSE PRACTITIONER

## 2021-02-25 PROCEDURE — G0123 SCREEN CERV/VAG THIN LAYER: HCPCS | Performed by: NURSE PRACTITIONER

## 2021-02-25 PROCEDURE — 88142 CYTOPATH C/V THIN LAYER: CPT | Performed by: NURSE PRACTITIONER

## 2021-02-25 PROCEDURE — 87210 SMEAR WET MOUNT SALINE/INK: CPT | Mod: ZL | Performed by: NURSE PRACTITIONER

## 2021-02-25 PROCEDURE — 87491 CHLMYD TRACH DNA AMP PROBE: CPT | Mod: ZL | Performed by: NURSE PRACTITIONER

## 2021-02-25 PROCEDURE — 87389 HIV-1 AG W/HIV-1&-2 AB AG IA: CPT | Mod: ZL | Performed by: NURSE PRACTITIONER

## 2021-02-25 PROCEDURE — 87591 N.GONORRHOEAE DNA AMP PROB: CPT | Mod: ZL | Performed by: NURSE PRACTITIONER

## 2021-02-25 PROCEDURE — 86803 HEPATITIS C AB TEST: CPT | Mod: ZL | Performed by: NURSE PRACTITIONER

## 2021-02-25 PROCEDURE — 73140 X-RAY EXAM OF FINGER(S): CPT | Mod: LT

## 2021-02-25 PROCEDURE — 84443 ASSAY THYROID STIM HORMONE: CPT | Mod: ZL | Performed by: NURSE PRACTITIONER

## 2021-02-25 PROCEDURE — 99395 PREV VISIT EST AGE 18-39: CPT | Performed by: NURSE PRACTITIONER

## 2021-02-25 PROCEDURE — 36415 COLL VENOUS BLD VENIPUNCTURE: CPT | Mod: ZL | Performed by: NURSE PRACTITIONER

## 2021-02-25 PROCEDURE — 85025 COMPLETE CBC W/AUTO DIFF WBC: CPT | Mod: ZL | Performed by: NURSE PRACTITIONER

## 2021-02-25 PROCEDURE — 86780 TREPONEMA PALLIDUM: CPT | Mod: ZL | Performed by: NURSE PRACTITIONER

## 2021-02-25 PROCEDURE — G0463 HOSPITAL OUTPT CLINIC VISIT: HCPCS | Mod: 25

## 2021-02-25 ASSESSMENT — PAIN SCALES - GENERAL: PAINLEVEL: NO PAIN (0)

## 2021-02-25 ASSESSMENT — MIFFLIN-ST. JEOR: SCORE: 1336.22

## 2021-02-25 NOTE — TELEPHONE ENCOUNTER
Patient is returning call, please call back, thank you!    Twyla Randolph on 2/25/2021 at 1:22 PM

## 2021-02-25 NOTE — LETTER
March 4, 2021      Jana Beyer  [address] [address]    Dear ,      This letter is in regards to the PAP smear test you had done recently. Your PAP test result is normal.     Please return in 3 years to repeat your pap smear and HPV test.     If you have additional questions regarding this result, please call.    Sincerely,      KENNETH Montes CNP

## 2021-02-25 NOTE — LETTER
March 4, 2021      Jana Beyer  [address]  [place] [address]        Dear ,    We are writing to inform you of your test results.    {results letter list:[id number]}    Resulted Orders   Pap Screen Thin Prep reflex to HPV if ASCUS - recommended age 25 - 29 years   Result Value Ref Range    PAP NIL     Copath Report         Patient Name: JANA BEYER  MR#: [id number]  Specimen #: [id number]  Collected: 2/25/2021  Received: 2/26/2021  Reported: 3/4/2021 07:31  Ordering Phy(s): ADILSON BALDERAS    For improved result formatting, select 'View Enhanced Report Format' under   Linked Documents section.    SPECIMEN/STAIN PROCESS:  Thin Prep Pap Screen - GICH (ThinPrep)       Pap Stain (GICH) x 1, Pap with reflex to HPV if ASCUS x 1    SOURCE: Cervical, endocervical  ----------------------------------------------------------------   Thin Prep Pap Screen - GICH (ThinPrep)  SPECIMEN ADEQUACY:  Satisfactory for evaluation.  -Transformation zone component present.    CYTOLOGIC INTERPRETATION:    Negative for intraepithelial lesion or malignancy    Electronically signed out by:  PARESH You (ASCP)    Processed and screened at [facility]    CLINICAL HISTORY:  LMP: N/A  Previous normal pap  Date of Last Pap: 2018,    Papanicolaou Test Limitations:  Cervical cytology is a screening t est with   limited sensitivity; regular  screening is critical for cancer prevention; Pap tests are primarily   effective for the diagnosis/prevention of  squamous cell carcinoma, not adenocarcinomas or other cancers.    TESTING LAB LOCATION:  [facility]  [address] [place] [address] [phone]    COLLECTION SITE:  Client:  [facility]  Location: [facility] ([facility])       GC/Chlamydia by PCR - HI,   Result Value Ref Range    Specimen Source Endocervical     Neisseria gonorrhoreae PCR Not Detected NDET^Not Detected      Comment:      NOT  DETECTED: Negative for N.gonorrhoeae genomic DNA by [employer]   real-time,reverse-transcriptase PCR. A negative result does not preclude the   presence of N.gonorrhoeae infection. The results are dependent on proper   collection,transport,processing of the specimen,and the presence of sufficient   DNA to be detected.      Chlamydia Trachomatis PCR Not Detected NDET^Not Detected      Comment:      NOTDETECTED: Negative for C.trachomatis genomic DNA by [employer]   real-time,reverse-transcriptase PCR. A negative result does not preclude the   presence of C.trachomatis infection. The results are dependent on proper   collection,transpoet,processing of specimen, and the presence of sufficient   DNA to be detected.     Wet prep   Result Value Ref Range    Specimen Description Vagina     Wet Prep No yeast seen     Wet Prep No clue cells seen     Wet Prep No Trichomonas seen    TSH Reflex GH   Result Value Ref Range    TSH Reflex 0.71 0.34 - 5.60 IU/mL   Treponema Ab w Reflex to RPR and Titer   Result Value Ref Range    Treponema Antibodies Nonreactive NR^Nonreactive      Comment:      Methodology Change: Test performed on the [employer] Liaison XL by Treponema   pallidum Total Antibodies Assay as of 3.17.2020.     Hepatitis C Screen Reflex to HCV RNA Quant and Genotype   Result Value Ref Range    Hepatitis C Antibody Nonreactive NR^Nonreactive      Comment:      Assay performance characteristics have not been established for newborns,   infants, and children     HIV Antigen Antibody Combo   Result Value Ref Range    HIV Antigen Antibody Combo Nonreactive NR^Nonreactive          Comment:      HIV-1 p24 Ag & HIV-1/HIV-2 Ab Not Detected   CBC and Differential   Result Value Ref Range    WBC 9.3 4.0 - 11.0 10e9/L    RBC Count 4.87 3.8 - 5.2 10e12/L    Hemoglobin 14.3 11.7 - 15.7 g/dL    Hematocrit 42.8 35.0 - 47.0 %    MCV 88 78 - 100 fl    MCH 29.4 26.5 - 33.0 pg    MCHC 33.4 31.5 - 36.5 g/dL    RDW 12.8 10.0 - 15.0 %    Platelet  Count 266 150 - 450 10e9/L    Diff Method Automated Method     % Neutrophils 68.0 %    % Lymphocytes 25.3 %    % Monocytes 5.4 %    % Eosinophils 0.4 %    % Basophils 0.5 %    % Immature Granulocytes 0.4 %    Absolute Neutrophil 6.3 1.6 - 8.3 10e9/L    Absolute Lymphocytes 2.4 0.8 - 5.3 10e9/L    Absolute Monocytes 0.5 0.0 - 1.3 10e9/L    Absolute Eosinophils 0.0 0.0 - 0.7 10e9/L    Absolute Basophils 0.1 0.0 - 0.2 10e9/L    Abs Immature Granulocytes 0.0 0 - 0.4 10e9/L   Comprehensive Metabolic Panel   Result Value Ref Range    Sodium 140 134 - 144 mmol/L    Potassium 4.1 3.5 - 5.1 mmol/L    Chloride 105 98 - 107 mmol/L    Carbon Dioxide 28 21 - 31 mmol/L    Anion Gap 7 3 - 14 mmol/L    Glucose 79 70 - 105 mg/dL    Urea Nitrogen 10 7 - 25 mg/dL    Creatinine 0.70 0.60 - 1.20 mg/dL    GFR Estimate >90 >60 mL/min/[1.73_m2]    GFR Estimate If Black >90 >60 mL/min/[1.73_m2]    Calcium 9.4 8.6 - 10.3 mg/dL    Bilirubin Total 0.3 0.3 - 1.0 mg/dL    Albumin 4.4 3.5 - 5.7 g/dL    Protein Total 6.5 6.4 - 8.9 g/dL    Alkaline Phosphatase 67 34 - 104 U/L    ALT 14 7 - 52 U/L    AST 15 13 - 39 U/L       If you have any questions or concerns, please call the clinic at the number listed above.       Sincerely,      KENNETH Fiore CNP

## 2021-02-25 NOTE — PATIENT INSTRUCTIONS
"Healthy Strategies  1. Eat at least 3 meals a day and never skip breakfast.  2. Eat more slowly.  3. Decrease portion size.  4. Provide structure by using meal replacement bars or shakes, and/or low calorie frozen meals.  5. For good nutrition incorporate fruit, vegetables, whole grains, lean protein, and low-fat dairy.  6. Remove trigger foods from yourenvironment to avoid impulse eating.  7. Increase physical activity: get a pedometer and aim for 10,000 steps a day or 30-35 minutes of activity 5 days per week.  8. Weigh yourself daily or at least weekly.  9. Keep a record of what you eat and your activity.  10. Establish a support system such as afriend, group or program.    11. Read Gurmeet Rogel's \"Eat to Live\". Remember it is important to have a minimum of 1200 calories a day, okay to use olive oil, 40 grams of fiber daily. No more than two servings (the size of your palm) of red meat a week.     Please consider the following general health recommendations:    Schedule a mammogram annually starting at the age of 40 years old unless recommended earlier by your primary care provider. Come for a general exam once yearly.    Eat a quality diet (generally, low in simple sugars, starches, cholesterol and saturated fat.)    Please get 1500 mg of calcium in divided doses with 1500 units vitamin D in your diet daily. Take supplements as needed to obtain full recommended amounts.     Stay physically active. Regular walking or other exercise is one of the best ways to minimize pain of arthritis; maintain independence and mobility; maintain bone strength; maintain conditioning of your heart. Find something you enjoy and a friend to do it with you.    Maintain ideal weight. Your Body mass index is Body mass index is 22.48 kg/m .. Generally a BMI of 20-25 is considered ideal. Overweight is defined as 25-30, obese is 30-35 and markedly obese is greater than 35.    Apply sun block (SPF 25 or greater) on exposed skin anytime you " are out in the sun to prevent skin cancer.     Wear a seatbelt whenever you are in a car.    Consider a bone density study every 2-5 years to see if you are at increased risk for fracture. If you have osteoporosis, medicine to strengthen your bones can significantly reduce your risk of fracture, back pain, and loss of height.    Colonoscopy (an exam of the colon) is recommended every 10 years after the age of 50 to screen for colon cancer. (More often if you are at increased risk.)    Obtain a flu shot every fall.    Receive a pneumonia shot series after the age of 65 (repeat in 5 years if you have other risk factors). This does not prevent all types of pneumonia, but reduces the risk of the worst bacterial causes of pneumonia.    You should have a tetanus booster at least once every 10 years.    A vaccine to reduce your chances of getting shingles is available if you are over 50. Information about the vaccine is available through the clinic or at:  (https://www.cdc.gov/vaccines/vpd/shingles/public/shingrix/index.html)    Check blood sugar annually. Cholesterol annually unless you have had a normal level when last checked within 5 years.     I recommend that you have a general physical exam every year. You should have a pap test every 3 years between the ages of 21 and 30 and every 3-5 years between the ages of 30 and 65 depending on your test unless you have had previous abnormal pap smears, (in these cases the exams and PAP's should be done on a schedule as recommended by your primary care provider). If you have had hysterectomy in the past, your future Pap plan may be different.

## 2021-02-25 NOTE — TELEPHONE ENCOUNTER
After verifying patient's name and date of birth, patient was given the below information.  Marjan Dorado....2/25/2021 1:36 PM

## 2021-02-25 NOTE — TELEPHONE ENCOUNTER
Please call patient and let her know that I have ordered a finger xray. She can call to schedule this at her convenience. I also sent in an antibiotic called Augmentin to take twice daily for 10 days to ensure no residual infection remains. KENNETH Montes CNP on 2/25/2021 at 11:29 AM

## 2021-02-25 NOTE — NURSING NOTE
"Chief Complaint   Patient presents with     Physical     Patient presents to clinic for physical. She would like to discuss potential problem with kidneys, and she would also like to talk about her left ring finger. She states she had an infection in it about two months ago, and it hasn't been the same since.     Initial /78 (BP Location: Right arm, Patient Position: Sitting, Cuff Size: Adult Regular)   Pulse 108   Temp 97.6  F (36.4  C) (Tympanic)   Resp 17   Ht 1.638 m (5' 4.5\")   Wt 60.3 kg (133 lb)   SpO2 98%   BMI 22.48 kg/m   Estimated body mass index is 22.48 kg/m  as calculated from the following:    Height as of this encounter: 1.638 m (5' 4.5\").    Weight as of this encounter: 60.3 kg (133 lb).         Medication Reconciliation: Complete      Marjan Dorado   "

## 2021-02-25 NOTE — PROGRESS NOTES
"Nursing Notes:   Marjan Dorado  2/25/2021  9:20 AM  Signed  Chief Complaint   Patient presents with     Physical     Patient presents to clinic for physical. She would like to discuss potential problem with kidneys, and she would also like to talk about her left ring finger. She states she had an infection in it about two months ago, and it hasn't been the same since.     Initial /78 (BP Location: Right arm, Patient Position: Sitting, Cuff Size: Adult Regular)   Pulse 108   Temp 97.6  F (36.4  C) (Tympanic)   Resp 17   Ht 1.638 m (5' 4.5\")   Wt 60.3 kg (133 lb)   SpO2 98%   BMI 22.48 kg/m   Estimated body mass index is 22.48 kg/m  as calculated from the following:    Height as of this encounter: 1.638 m (5' 4.5\").    Weight as of this encounter: 60.3 kg (133 lb).         Medication Reconciliation: Complete      Marjan Dorado       ANNUAL PHYSICAL - FEMALE    HPI: Jana Beyer is a 25 year old female who presents for routine physical exam.  She has a recent history of heavy drug use.  She was using methamphetamines up until the last month.  Denies any needle use.  She is currently living back in [place] and trying to get herself sober for herself and not for other people.  She is staying with her sons father currently but plans to live with her sister.    She reports that while she was out of town she had an injury to her left fourth finger from a piece of glass.  She was seen and told that she had cellulitis.  There was concerns about severe infection.  This was about 2 months ago.  She states she took some antibiotic but does not know what this was.  She also took something that her friend bought in [place].  She continues have occasional pain to the finger.  It still red and swollen.  She is worried that this did not get fully treated.      No LMP recorded.   Contraception: none, condoms used  Risk for STI?: yes, 2 male partners in past 6 months  Last pap: 2018  Any hx of abnormal paps:  " none  FH of early CA?: none  Cholesterol/DM concerns/screening: Due for labs  Tobacco?: yes, 1/2 ppd, marijuana  Calcium intake: Dietary  DEXA: n/a  Last mammo: n/a  Colonoscopy: n/a  Immunizations: recommend influenza    Patient Active Problem List    Diagnosis Date Noted     Lateral knee pain, right 01/17/2019     Priority: Medium     Fibromyalgia 11/25/2017     Priority: Medium     Myofascial muscle pain 11/25/2017     Priority: Medium     Acne vulgaris 12/06/2016     Priority: Medium     Episodic mood disorder (H) 12/06/2016     Priority: Medium     Engages in binge consumption of alcohol 11/27/2016     Priority: Medium     History of suicide attempt 11/27/2016     Priority: Medium     Tetrahydrocannabinol (THC) use disorder, mild, abuse 05/25/2016     Priority: Medium     Tanning jo use 05/13/2016     Priority: Medium     Chlamydial infection 05/12/2015     Priority: Medium     Overview:   Treated with azithromycin 1g x 1 dose on 5/12/15.       Contraceptive management 02/05/2014     Priority: Medium     Allergic rhinitis 07/25/2011     Priority: Medium     Narcotic abuse, episodic (H) 07/21/2011     Priority: Medium     Overview:   THC noted on tox screen 5/2016 and 11/27/2016       Tobacco abuse 03/19/2011     Priority: Medium       Past Medical History:   Diagnosis Date     Alcohol use with intoxication (H)      08/22/2009,Hospitalized w/acute alcohol intoxication     Alcohol use with intoxication (H)     11/27/2016     Ectopic pregnancy without intrauterine pregnancy     11/2/2012     Encounter for elective termination of pregnancy     4/29/2011     Encounter for routine checking of intrauterine contraceptive device     11/15/2011     Hemorrhage of anus and rectum     7/11/2011     Nicotine dependence, uncomplicated     3/19/2011     Other psychoactive substance abuse, uncomplicated (H)     7/21/2011,THC noted on tox screen 5/2016 and 11/27/2016     Poisoning by unspecified drugs, medicaments and  biological substances, intentional self-harm, initial encounter (H)     11/27/2016     Polyhydramnios     11/14/2013     Tubal pregnancy without intrauterine pregnancy     11/2/2012       Past Surgical History:   Procedure Laterality Date     SALPINGO-OOPHORECTOMY BILATERAL      11/02/12,Partial Left ,ectopic. Laprascopic salpingectomy       Family History   Problem Relation Age of Onset     Other - See Comments Brother         ADHD     No Known Problems Mother      No Known Problems Sister      Thyroid Disease Maternal Grandmother      Diabetes Paternal Grandmother        Social History     Socioeconomic History     Marital status: Single     Spouse name: Not on file     Number of children: Not on file     Years of education: Not on file     Highest education level: Not on file   Occupational History     Not on file   Social Needs     Financial resource strain: Not on file     Food insecurity     Worry: Not on file     Inability: Not on file     Transportation needs     Medical: Not on file     Non-medical: Not on file   Tobacco Use     Smoking status: Current Every Day Smoker     Packs/day: 0.50     Years: 4.00     Pack years: 2.00     Types: Cigarettes     Smokeless tobacco: Never Used   Substance and Sexual Activity     Alcohol use: Yes     Alcohol/week: 0.0 standard drinks     Comment: once in a while     Drug use: Not Currently     Types: Fentanyl, Opiates, Other     Comment: FORMER Yes- Snorting Fentanyl / Heroin x few months, as of 11/25/2017     Sexual activity: Yes     Partners: Male     Comment: Birth control method: high risk sexual activity   Lifestyle     Physical activity     Days per week: Not on file     Minutes per session: Not on file     Stress: Not on file   Relationships     Social connections     Talks on phone: Not on file     Gets together: Not on file     Attends [employer] service: Not on file     Active member of club or organization: Not on file     Attends meetings of clubs or  "organizations: Not on file     Relationship status: Not on file     Intimate partner violence     Fear of current or ex partner: Not on file     Emotionally abused: Not on file     Physically abused: Not on file     Forced sexual activity: Not on file   Other Topics Concern     Parent/sibling w/ CABG, MI or angioplasty before 65F 55M? Not Asked   Social History Narrative    Mother - Saadia Ness  Moved back to [place]. Staying with son's dad/may move with sister. One sonAntonio       Current Outpatient Medications   Medication Sig Dispense Refill     amoxicillin-clavulanate (AUGMENTIN) 875-125 MG tablet Take 1 tablet by mouth 2 times daily for 10 days 20 tablet 0       No Known Allergies    REVIEW OF SYSTEMS:  Complete review of systems was obtained.  All pertinent positives and negatives are noted above.    PHYSICAL EXAM:  /78 (BP Location: Right arm, Patient Position: Sitting, Cuff Size: Adult Regular)   Pulse 108   Temp 97.6  F (36.4  C) (Tympanic)   Resp 17   Ht 1.638 m (5' 4.5\")   Wt 60.3 kg (133 lb)   SpO2 98%   BMI 22.48 kg/m    CONSTITUTIONAL:  Alert,cooperative, NAD.  EYES: No scleral icterus.  PERRLA.  Conjunctiva clear.  ENT/MOUTH: External ears and nose normal.  TMs normal.  Moist mucous membranes. Oropharynx clear.    ENDO: No thyromegaly or thyroidnodules.  LYMPH:  No cervical or supraclavicular LA.    CARDIOVASCULAR: Regular,S1, S2.  No S3 or S4.  No murmur/gallop/rub.  No peripheral edema.  RESPIRATORY: CTA bilaterally, no wheezes, rhonchi or rales.  GI: Bowel sounds wnl.  Soft, nontender, nondistended.  No masses or HSM.  No rebound orguarding.  : Vulva: normal, no lesions or discharge  Urethral meatus: normal size and location, no lesions or discharge  Urethra: no tenderness or masses  Bladder: no fullness or tenderness  Vagina: normalappearance, no abnormal discharge, no lesions.  No evidence of cystocele or rectocele.  Cervix: normal appearance, no lesions, no abnormal " discharge, no cervical motion tenderness  Uterus: normal size and position,mobile, non-tender  Adnexa: no palpable masses bilaterally. No cervical motion tenderness.  Pap smear obtained: Yes  MSKEL: Grossly normal ROM.  Noclubbing.  INTEGUMENTARY:  Warm, dry.  No rash noted on exposed skin.  NEUROLOGIC: Facies symmetric.  Grossly normal movement and tone.  No tremor.  PSYCHIATRIC: Affect normal.  Speech fluent.      PHQ Depression Screen  PHQ-9 SCORE 12/6/2016 1/17/2019 4/17/2019   PHQ-9 Total Score 6 0 0       Labs:  Results for orders placed or performed during the hospital encounter of 02/25/21   XR Finger Left G/E 2 Views     Status: None    Narrative    XR FINGER LT G/E 2 VW    HISTORY: 25 years Female Finger infection    COMPARISON: None    TECHNIQUE: 3 views left fourth digit    FINDINGS: Joint spaces are congruent. Articular surfaces are smooth.  There is soft tissue edema of the proximal digit at the level of the  PIP articulation. There is no evidence of fracture dislocation or  osteolytic change.      Impression    IMPRESSION: Mild soft tissue edema. Exam otherwise negative.    ROSA CHINCHILLA MD   Results for orders placed or performed in visit on 02/25/21   TSH Reflex GH     Status: None   Result Value Ref Range    TSH Reflex 0.71 0.34 - 5.60 IU/mL   CBC and Differential     Status: None   Result Value Ref Range    WBC 9.3 4.0 - 11.0 10e9/L    RBC Count 4.87 3.8 - 5.2 10e12/L    Hemoglobin 14.3 11.7 - 15.7 g/dL    Hematocrit 42.8 35.0 - 47.0 %    MCV 88 78 - 100 fl    MCH 29.4 26.5 - 33.0 pg    MCHC 33.4 31.5 - 36.5 g/dL    RDW 12.8 10.0 - 15.0 %    Platelet Count 266 150 - 450 10e9/L    Diff Method Automated Method     % Neutrophils 68.0 %    % Lymphocytes 25.3 %    % Monocytes 5.4 %    % Eosinophils 0.4 %    % Basophils 0.5 %    % Immature Granulocytes 0.4 %    Absolute Neutrophil 6.3 1.6 - 8.3 10e9/L    Absolute Lymphocytes 2.4 0.8 - 5.3 10e9/L    Absolute Monocytes 0.5 0.0 - 1.3 10e9/L    Absolute  Eosinophils 0.0 0.0 - 0.7 10e9/L    Absolute Basophils 0.1 0.0 - 0.2 10e9/L    Abs Immature Granulocytes 0.0 0 - 0.4 10e9/L   Comprehensive Metabolic Panel     Status: None   Result Value Ref Range    Sodium 140 134 - 144 mmol/L    Potassium 4.1 3.5 - 5.1 mmol/L    Chloride 105 98 - 107 mmol/L    Carbon Dioxide 28 21 - 31 mmol/L    Anion Gap 7 3 - 14 mmol/L    Glucose 79 70 - 105 mg/dL    Urea Nitrogen 10 7 - 25 mg/dL    Creatinine 0.70 0.60 - 1.20 mg/dL    GFR Estimate >90 >60 mL/min/[1.73_m2]    GFR Estimate If Black >90 >60 mL/min/[1.73_m2]    Calcium 9.4 8.6 - 10.3 mg/dL    Bilirubin Total 0.3 0.3 - 1.0 mg/dL    Albumin 4.4 3.5 - 5.7 g/dL    Protein Total 6.5 6.4 - 8.9 g/dL    Alkaline Phosphatase 67 34 - 104 U/L    ALT 14 7 - 52 U/L    AST 15 13 - 39 U/L   GC/Chlamydia by PCR - [facility]     Status: None    Specimen: Endocervical Swab   Result Value Ref Range    Specimen Source Endocervical     Neisseria gonorrhoreae PCR Not Detected NDET^Not Detected    Chlamydia Trachomatis PCR Not Detected NDET^Not Detected   Wet prep     Status: None    Specimen: Vagina   Result Value Ref Range    Specimen Description Vagina     Wet Prep No yeast seen     Wet Prep No clue cells seen     Wet Prep No Trichomonas seen        ASSESSMENT AND PLAN:    1. Routine history and physical examination of adult    2. Cervical cancer screening    3. Methamphetamine abuse (H)    4. High risk heterosexual behavior    5. Screening for thyroid disorder    6. Finger infection      Labs completed as noted above.  No gonorrhea chlamydia, no bacterial vaginosis.  Comp panel stable.  White count and thyroid are normal.  Finger x-ray with no foreign bodies.    Remainder of labs are still pending.    Placed patient on Augmentin twice a day for 10 days for possible residual infection.  Follow-up as needed.    Relevant cancer screening discussed.    Counseled on healthy diet, Calcium and vitamin D intake, and exercise.    KENNETH Montes CNP  ....................  2/25/2021   9:12 AM       This document was prepared using voice generated software.  While every attempt was made for accuracy, grammatical errors may exist.

## 2021-02-26 ENCOUNTER — TELEPHONE (OUTPATIENT)
Dept: INTERNAL MEDICINE | Facility: OTHER | Age: 26
End: 2021-02-26

## 2021-02-26 LAB
HCV AB SERPL QL IA: NONREACTIVE
HIV 1+2 AB+HIV1 P24 AG SERPL QL IA: NONREACTIVE
T PALLIDUM AB SER QL: NONREACTIVE

## 2021-02-26 NOTE — TELEPHONE ENCOUNTER
Patient is returning Dorothea Tracy's Nurse's call.  Please call back.  The new phone number is listed on this note.      Beverly Kim on 2/26/2021 at 11:56 AM

## 2021-03-04 LAB
COPATH REPORT: NORMAL
PAP: NORMAL

## 2021-05-27 ENCOUNTER — OFFICE VISIT (OUTPATIENT)
Dept: FAMILY MEDICINE | Facility: OTHER | Age: 26
End: 2021-05-27
Attending: NURSE PRACTITIONER
Payer: COMMERCIAL

## 2021-05-27 ENCOUNTER — TELEPHONE (OUTPATIENT)
Dept: INTERNAL MEDICINE | Facility: OTHER | Age: 26
End: 2021-05-27

## 2021-05-27 VITALS
HEART RATE: 95 BPM | SYSTOLIC BLOOD PRESSURE: 120 MMHG | HEIGHT: 65 IN | TEMPERATURE: 97.2 F | OXYGEN SATURATION: 99 % | WEIGHT: 133.4 LBS | DIASTOLIC BLOOD PRESSURE: 100 MMHG | RESPIRATION RATE: 20 BRPM | BODY MASS INDEX: 22.23 KG/M2

## 2021-05-27 DIAGNOSIS — N92.1 MENORRHAGIA WITH IRREGULAR CYCLE: Primary | ICD-10-CM

## 2021-05-27 DIAGNOSIS — Z32.01 POSITIVE URINE PREGNANCY TEST: ICD-10-CM

## 2021-05-27 LAB
B-HCG SERPL-ACNC: 6543 IU/L
BASOPHILS # BLD AUTO: 0 10E9/L (ref 0–0.2)
BASOPHILS NFR BLD AUTO: 0.3 %
DIFFERENTIAL METHOD BLD: ABNORMAL
EOSINOPHIL # BLD AUTO: 0.1 10E9/L (ref 0–0.7)
EOSINOPHIL NFR BLD AUTO: 0.5 %
ERYTHROCYTE [DISTWIDTH] IN BLOOD BY AUTOMATED COUNT: 12.6 % (ref 10–15)
HCG UR QL: POSITIVE
HCT VFR BLD AUTO: 39.4 % (ref 35–47)
HGB BLD-MCNC: 13.7 G/DL (ref 11.7–15.7)
IMM GRANULOCYTES # BLD: 0.1 10E9/L (ref 0–0.4)
IMM GRANULOCYTES NFR BLD: 0.3 %
LYMPHOCYTES # BLD AUTO: 2.9 10E9/L (ref 0.8–5.3)
LYMPHOCYTES NFR BLD AUTO: 18.8 %
MCH RBC QN AUTO: 30.1 PG (ref 26.5–33)
MCHC RBC AUTO-ENTMCNC: 34.8 G/DL (ref 31.5–36.5)
MCV RBC AUTO: 87 FL (ref 78–100)
MONOCYTES # BLD AUTO: 0.9 10E9/L (ref 0–1.3)
MONOCYTES NFR BLD AUTO: 5.6 %
NEUTROPHILS # BLD AUTO: 11.4 10E9/L (ref 1.6–8.3)
NEUTROPHILS NFR BLD AUTO: 74.5 %
PLATELET # BLD AUTO: 282 10E9/L (ref 150–450)
PROGEST SERPL-MCNC: 2.7 NG/ML
RBC # BLD AUTO: 4.55 10E12/L (ref 3.8–5.2)
WBC # BLD AUTO: 15.3 10E9/L (ref 4–11)

## 2021-05-27 PROCEDURE — G0463 HOSPITAL OUTPT CLINIC VISIT: HCPCS

## 2021-05-27 PROCEDURE — 85025 COMPLETE CBC W/AUTO DIFF WBC: CPT | Mod: ZL | Performed by: NURSE PRACTITIONER

## 2021-05-27 PROCEDURE — 36415 COLL VENOUS BLD VENIPUNCTURE: CPT | Mod: ZL | Performed by: NURSE PRACTITIONER

## 2021-05-27 PROCEDURE — 99213 OFFICE O/P EST LOW 20 MIN: CPT | Performed by: NURSE PRACTITIONER

## 2021-05-27 PROCEDURE — 81025 URINE PREGNANCY TEST: CPT | Mod: ZL | Performed by: NURSE PRACTITIONER

## 2021-05-27 PROCEDURE — 84144 ASSAY OF PROGESTERONE: CPT | Mod: ZL | Performed by: NURSE PRACTITIONER

## 2021-05-27 PROCEDURE — 84702 CHORIONIC GONADOTROPIN TEST: CPT | Mod: ZL | Performed by: NURSE PRACTITIONER

## 2021-05-27 ASSESSMENT — PAIN SCALES - GENERAL: PAINLEVEL: NO PAIN (0)

## 2021-05-27 ASSESSMENT — MIFFLIN-ST. JEOR: SCORE: 1333.04

## 2021-05-27 NOTE — PROGRESS NOTES
"HPI:    Jana Beyer is a 26 year old female who presents to clinic today for heavy menses.  She reports that 2020 she had an .  She has not had her menses since.  She does have remote history of ectopic pregnancy with 1 fallopian tube removed.  Unprotected sex approximately 2 months ago and none since.  1 week ago she started her menses which seem to be normal.  At 3:00 this morning she woke up with cramping and has been passing large blood clots up to the size of tennis balls.  She states she will put a heavy tampon and a pad in and will bleed through her clothing approximately 15 to 30 minutes.  This is happened several times throughout the day today.  She has been taking ibuprofen for symptomatic management.    Past Medical History:   Diagnosis Date     Alcohol use with intoxication (H)      2009,Hospitalized w/acute alcohol intoxication     Alcohol use with intoxication (H)     2016     Ectopic pregnancy without intrauterine pregnancy     2012     Encounter for elective termination of pregnancy     2011     Encounter for routine checking of intrauterine contraceptive device     11/15/2011     Hemorrhage of anus and rectum     2011     Nicotine dependence, uncomplicated     3/19/2011     Other psychoactive substance abuse, uncomplicated (H)     2011,THC noted on tox screen 2016 and 2016     Poisoning by unspecified drugs, medicaments and biological substances, intentional self-harm, initial encounter (H)     2016     Polyhydramnios     2013     Tubal pregnancy without intrauterine pregnancy     2012         No current outpatient medications on file.       No Known Allergies    ROS:  Pertinent positives and negatives are noted in HPI.    EXAM:  BP (!) 120/100 (BP Location: Right arm, Patient Position: Sitting, Cuff Size: Adult Regular)   Pulse 95   Temp 97.2  F (36.2  C) (Temporal)   Resp 20   Ht 1.638 m (5' 4.5\")   Wt 60.5 kg (133 lb " 6.4 oz)   LMP 05/20/2021 (Approximate)   SpO2 99%   Breastfeeding No   BMI 22.54 kg/m    General appearance: well appearing female, in no acute distress  Respiratory: clear to auscultation bilaterally  Cardiac: RRR with no murmurs  Abdomen: soft, r suprapubic tenderness with palpation, no masses or organomegaly, normal bowel sounds  Psychological: normal affect, alert and pleasant  Results for orders placed or performed in visit on 05/27/21   Pregnancy, Urine (HCG)     Status: Abnormal   Result Value Ref Range    HCG Qual Urine Positive (A) NEG^Negative   CBC and Differential     Status: Abnormal   Result Value Ref Range    WBC 15.3 (H) 4.0 - 11.0 10e9/L    RBC Count 4.55 3.8 - 5.2 10e12/L    Hemoglobin 13.7 11.7 - 15.7 g/dL    Hematocrit 39.4 35.0 - 47.0 %    MCV 87 78 - 100 fl    MCH 30.1 26.5 - 33.0 pg    MCHC 34.8 31.5 - 36.5 g/dL    RDW 12.6 10.0 - 15.0 %    Platelet Count 282 150 - 450 10e9/L    Diff Method Automated Method     % Neutrophils 74.5 %    % Lymphocytes 18.8 %    % Monocytes 5.6 %    % Eosinophils 0.5 %    % Basophils 0.3 %    % Immature Granulocytes 0.3 %    Absolute Neutrophil 11.4 (H) 1.6 - 8.3 10e9/L    Absolute Lymphocytes 2.9 0.8 - 5.3 10e9/L    Absolute Monocytes 0.9 0.0 - 1.3 10e9/L    Absolute Eosinophils 0.1 0.0 - 0.7 10e9/L    Absolute Basophils 0.0 0.0 - 0.2 10e9/L    Abs Immature Granulocytes 0.1 0 - 0.4 10e9/L       ASSESSMENT AND PLAN:    1. Menorrhagia with irregular cycle    2. Positive urine pregnancy test        Urine pregnancy is positive.  Hemoglobin is stable, white blood cell count is mildly elevated.  I did review the case with Dr. Martinez gynecology.  Beta-hCG is currently pending as well as progesterone.  It appears that she is miscarrying currently.  Recommendations is to repeat beta-hCG on Saturday.  She is aware to present to the emergency room to complete this blood test.  Dr. Dave Murry is on-call this weekend and he will follow up on test results when they are  available and provide further recommendations.  We did review signs and symptoms that would warrant urgent follow-up in the emergency room in our clinic.  She is understanding all of the above and has no further questions.    KENNETH Montes CNP..................5/27/2021 1:25 PM

## 2021-05-27 NOTE — TELEPHONE ENCOUNTER
Returned patients call and no answer and unable to leave voicemail as mailbox is full.   Kathy Murry RN on 5/27/2021 at 1:19 PM    Addendum:  Patient seeing Dorothea Tracy NP today.  Kathy Murry RN on 5/27/2021 at 1:40 PM

## 2021-05-27 NOTE — NURSING NOTE
"Chief Complaint   Patient presents with     Menstrual Problem     heavy menses with clots     Patient presents to clinic with heavy menses with large clots, about the size of a tennis ball and about a dozen of them. She states she had an  a year ago, and had one cycle after that about two months later. This is the first cycle she has had again since then. She states she was a little light headed today and her cramping has been pretty bad until she takes ibuprofen.     Initial BP (!) 120/100 (BP Location: Right arm, Patient Position: Sitting, Cuff Size: Adult Regular)   Pulse 95   Temp 97.2  F (36.2  C) (Temporal)   Resp 20   Ht 1.638 m (5' 4.5\")   Wt 60.5 kg (133 lb 6.4 oz)   LMP 2021 (Approximate)   SpO2 99%   Breastfeeding No   BMI 22.54 kg/m   Estimated body mass index is 22.54 kg/m  as calculated from the following:    Height as of this encounter: 1.638 m (5' 4.5\").    Weight as of this encounter: 60.5 kg (133 lb 6.4 oz).         Medication Reconciliation: Complete      Marjan Dorado   "

## 2021-05-27 NOTE — TELEPHONE ENCOUNTER
Pt called stating she had not had her period in over a year, now she is bleeding consistently, going through a pad roughly every 20 minutes with large clots of blood.     Tisha Lai on 5/27/2021 at 12:49 PM

## 2021-05-27 NOTE — Clinical Note
Dave,    I talk to Jimbo regarding this case.  He said you are on call this weekend and would be able to follow-up on her beta hCG on Saturday.  I suspect she is miscarrying.  Please let me know if you have any questions.    KENNETH Montes CNP on 5/27/2021 at 2:04 PM

## 2021-06-01 ENCOUNTER — TELEPHONE (OUTPATIENT)
Dept: FAMILY MEDICINE | Facility: OTHER | Age: 26
End: 2021-06-01

## 2021-06-01 NOTE — TELEPHONE ENCOUNTER
Call placed to patient for follow up of visit on 5/27/21. Patient states she was very busy on Saturday, so did not come back for repeat B-hcg. She reports her bleeding was significantly less by Saturday and stopped all together yesterday. She has no concerns today. We reviewed her results from 5/27 and she was advised to come in today for follow up lab to ensure her hcg is dropping. Patient agrees and was transferred to scheduling.    Lisa De Leon RN...................6/1/2021 8:12 AM

## 2021-06-02 DIAGNOSIS — N92.1 MENORRHAGIA WITH IRREGULAR CYCLE: ICD-10-CM

## 2021-06-02 LAB — B-HCG SERPL-ACNC: 216 IU/L

## 2021-06-02 PROCEDURE — 84702 CHORIONIC GONADOTROPIN TEST: CPT | Mod: ZL | Performed by: NURSE PRACTITIONER

## 2021-06-02 PROCEDURE — 36415 COLL VENOUS BLD VENIPUNCTURE: CPT | Mod: ZL | Performed by: NURSE PRACTITIONER

## 2021-07-23 ENCOUNTER — MEDICAL CORRESPONDENCE (OUTPATIENT)
Dept: HEALTH INFORMATION MANAGEMENT | Facility: OTHER | Age: 26
End: 2021-07-23

## 2023-05-02 ENCOUNTER — OFFICE VISIT (OUTPATIENT)
Dept: INTERNAL MEDICINE | Facility: OTHER | Age: 28
End: 2023-05-02
Payer: COMMERCIAL

## 2023-05-02 VITALS
OXYGEN SATURATION: 100 % | TEMPERATURE: 97.2 F | BODY MASS INDEX: 23.46 KG/M2 | HEART RATE: 73 BPM | SYSTOLIC BLOOD PRESSURE: 122 MMHG | WEIGHT: 138.8 LBS | DIASTOLIC BLOOD PRESSURE: 70 MMHG | RESPIRATION RATE: 16 BRPM

## 2023-05-02 DIAGNOSIS — Z30.017 NEXPLANON INSERTION: Primary | ICD-10-CM

## 2023-05-02 LAB — HCG UR QL: NEGATIVE

## 2023-05-02 PROCEDURE — G0463 HOSPITAL OUTPT CLINIC VISIT: HCPCS | Mod: 25

## 2023-05-02 PROCEDURE — 250N000009 HC RX 250

## 2023-05-02 PROCEDURE — 99213 OFFICE O/P EST LOW 20 MIN: CPT | Mod: 25

## 2023-05-02 PROCEDURE — 250N000011 HC RX IP 250 OP 636

## 2023-05-02 PROCEDURE — G0463 HOSPITAL OUTPT CLINIC VISIT: HCPCS

## 2023-05-02 PROCEDURE — 81025 URINE PREGNANCY TEST: CPT | Mod: ZL

## 2023-05-02 PROCEDURE — 11981 INSERTION DRUG DLVR IMPLANT: CPT

## 2023-05-02 RX ADMIN — ETONOGESTREL 68 MG: 68 IMPLANT SUBCUTANEOUS at 10:56

## 2023-05-02 RX ADMIN — LIDOCAINE HYDROCHLORIDE 3 ML: 10 INJECTION, SOLUTION INFILTRATION; PERINEURAL at 10:57

## 2023-05-02 ASSESSMENT — PAIN SCALES - GENERAL: PAINLEVEL: NO PAIN (0)

## 2023-05-02 NOTE — NURSING NOTE
"Chief Complaint   Patient presents with     Contraception     Nexplanon insertion      Patient is here for Nexplanon insertion     Initial /70   Pulse 73   Temp 97.2  F (36.2  C) (Tympanic)   Resp 16   Wt 63 kg (138 lb 12.8 oz)   LMP 05/02/2023   SpO2 100%   Breastfeeding No   BMI 23.46 kg/m   Estimated body mass index is 23.46 kg/m  as calculated from the following:    Height as of 5/27/21: 1.638 m (5' 4.5\").    Weight as of this encounter: 63 kg (138 lb 12.8 oz).  Medication Reconciliation: complete    Jeanine Charles CMA       FOOD SECURITY SCREENING QUESTIONS:    The next two questions are to help us understand your food security.  If you are feeling you need any assistance in this area, we have resources available to support you today.    Hunger Vital Signs:  Within the past 12 months we worried whether our food would run out before we got money to buy more. Never  Within the past 12 months the food we bought just didn't last and we didn't have money to get more. Never  Jeanine Charles CMA,LPN on 5/2/2023 at 10:08 AM      "

## 2023-05-02 NOTE — PROGRESS NOTES
Assessment & Plan   Jana Beyer is a 28 year old presenting for the following health issues:      ICD-10-CM    1. Nexplanon insertion  Z30.017 Pregnancy, Urine (HCG)     Pregnancy, Urine (HCG)     lidocaine 1 % 10 mL     etonogestrel (NEXPLANON) subdermal implant 68 mg        CC: 28 year old Female who presents for nexplanon placement    HPI Comments: Patient presents for Nexplanon insertion/placement. Discussed options previously. Discussed risks and SEs previously. Consent form reviewed, questions answered, and signed by patient and myself.  She has had a Nexplanon in the past, currently using oral birth control method, stopped it yesterday.  hCG negative today.  Patient would like to proceed with procedure, informed her that this is effective for 3 years of birth control.    REVIEW OF SYSTEMS:  ROS see HPI otherwise negative    OBJECTIVE:  /70   Pulse 73   Temp 97.2  F (36.2  C) (Tympanic)   Resp 16   Wt 63 kg (138 lb 12.8 oz)   LMP 05/02/2023   SpO2 100%   Breastfeeding No   BMI 23.46 kg/m      EXAM:   Physical Exam   Constitutional: She is oriented to person, place, and time and well-developed, well-nourished, and in no distress.   Eyes: Conjunctivae are normal.   Cardiovascular: Normal rate.    Pulmonary/Chest: Effort normal.   Neurological: She is alert and oriented to person, place, and time.   Skin: No rash noted.   Psychiatric: Mood and affect normal.     L arm is  positioned. Landmarks marked. Anesthesized with 2ccs of lido with epi. Area is cleansed with chloroprep. Nexplanon obturator is introduced without difficulty and Nexplanon is placed according to the manufacturers directions. The Nexplanon rory is palpated by myself and the patient after the procedure. Steri strip placed as well as a compression dressing.  Pt. tolerated procedure fine.     ASSESSMENT/PLAN:  1. Nexplanon insertion        Plan:  Remove compression dressing in 2-3 hours. Limited lifting in the next 24 hours. Follow  up as needed with questions and concerns. Discussed options for treating potential irregular bleeding on Nexplanon.     KENNETH Carrillo CNP     No follow-ups on file.    KENNETH Carrillo CNP  [facility]      Answers for HPI/ROS submitted by the patient on 5/2/2023  What is the reason for your visit today? : birth control  How many servings of fruits and vegetables do you eat daily?: 2-3  On average, how many sweetened beverages do you drink each day (Examples: soda, juice, sweet tea, etc.  Do NOT count diet or artificially sweetened beverages)?: 2  How many minutes a day do you exercise enough to make your heart beat faster?: 60 or more  How many days a week do you exercise enough to make your heart beat faster?: 4  How many days per week do you miss taking your medication?: 0

## (undated) RX ORDER — ACETAMINOPHEN 500 MG
TABLET ORAL
Status: DISPENSED
Start: 2018-07-15

## (undated) RX ORDER — LORAZEPAM 1 MG/1
TABLET ORAL
Status: DISPENSED
Start: 2018-07-21

## (undated) RX ORDER — LIDOCAINE HYDROCHLORIDE 10 MG/ML
INJECTION, SOLUTION INFILTRATION; PERINEURAL
Status: DISPENSED
Start: 2023-05-02